# Patient Record
Sex: FEMALE | Race: WHITE | NOT HISPANIC OR LATINO | Employment: FULL TIME | URBAN - METROPOLITAN AREA
[De-identification: names, ages, dates, MRNs, and addresses within clinical notes are randomized per-mention and may not be internally consistent; named-entity substitution may affect disease eponyms.]

---

## 2023-03-16 LAB
BACTERIA, URINE: ABNORMAL /HPF
MUCUS, URINE: ABNORMAL /LPF
RBC, URINE: 2 /HPF (ref 0–5)
SQUAMOUS EPITHELIAL CELLS, URINE: 3 /HPF
WBC, URINE: 48 /HPF (ref 0–5)

## 2023-03-19 LAB — URINE CULTURE: NORMAL

## 2023-11-21 ENCOUNTER — OFFICE VISIT (OUTPATIENT)
Dept: UROLOGY | Facility: CLINIC | Age: 63
End: 2023-11-21
Payer: COMMERCIAL

## 2023-11-21 VITALS — DIASTOLIC BLOOD PRESSURE: 82 MMHG | SYSTOLIC BLOOD PRESSURE: 141 MMHG | TEMPERATURE: 97.1 F | HEART RATE: 90 BPM

## 2023-11-21 DIAGNOSIS — N39.0 RECURRENT UTI: Primary | ICD-10-CM

## 2023-11-21 DIAGNOSIS — N32.81 OAB (OVERACTIVE BLADDER): ICD-10-CM

## 2023-11-21 LAB
APPEARANCE UR: ABNORMAL
BACTERIA #/AREA URNS AUTO: ABNORMAL /HPF
BILIRUB UR STRIP.AUTO-MCNC: NEGATIVE MG/DL
COLOR UR: YELLOW
GLUCOSE UR STRIP.AUTO-MCNC: NEGATIVE MG/DL
HYALINE CASTS #/AREA URNS AUTO: ABNORMAL /LPF
KETONES UR STRIP.AUTO-MCNC: NEGATIVE MG/DL
LEUKOCYTE ESTERASE UR QL STRIP.AUTO: ABNORMAL
MUCOUS THREADS #/AREA URNS AUTO: ABNORMAL /LPF
NITRITE UR QL STRIP.AUTO: POSITIVE
PH UR STRIP.AUTO: 5 [PH]
PROT UR STRIP.AUTO-MCNC: NEGATIVE MG/DL
RBC # UR STRIP.AUTO: NEGATIVE /UL
RBC #/AREA URNS AUTO: ABNORMAL /HPF
SP GR UR STRIP.AUTO: 1.02
SQUAMOUS #/AREA URNS AUTO: ABNORMAL /HPF
UROBILINOGEN UR STRIP.AUTO-MCNC: <2 MG/DL
WBC #/AREA URNS AUTO: ABNORMAL /HPF

## 2023-11-21 PROCEDURE — 1036F TOBACCO NON-USER: CPT | Performed by: NURSE PRACTITIONER

## 2023-11-21 PROCEDURE — 81001 URINALYSIS AUTO W/SCOPE: CPT

## 2023-11-21 PROCEDURE — 99214 OFFICE O/P EST MOD 30 MIN: CPT | Performed by: NURSE PRACTITIONER

## 2023-11-21 RX ORDER — METFORMIN HYDROCHLORIDE EXTENDED-RELEASE TABLETS 1000 MG/1
1000 TABLET, FILM COATED, EXTENDED RELEASE ORAL
COMMUNITY
End: 2024-05-22 | Stop reason: ALTCHOICE

## 2023-11-21 RX ORDER — MIRABEGRON 25 MG/1
25 TABLET, FILM COATED, EXTENDED RELEASE ORAL DAILY
Qty: 30 TABLET | Refills: 3 | Status: SHIPPED | OUTPATIENT
Start: 2023-11-21 | End: 2024-03-20

## 2023-11-21 RX ORDER — NITROFURANTOIN 25; 75 MG/1; MG/1
100 CAPSULE ORAL DAILY
Qty: 90 CAPSULE | Refills: 1 | Status: SHIPPED | OUTPATIENT
Start: 2023-11-21 | End: 2024-05-22 | Stop reason: ALTCHOICE

## 2023-11-21 RX ORDER — METOPROLOL SUCCINATE 25 MG/1
25 TABLET, EXTENDED RELEASE ORAL DAILY
COMMUNITY
End: 2024-05-22 | Stop reason: SDUPTHER

## 2023-11-21 RX ORDER — PRAVASTATIN SODIUM 40 MG/1
40 TABLET ORAL NIGHTLY
COMMUNITY
End: 2024-05-22 | Stop reason: SDUPTHER

## 2023-11-21 RX ORDER — SOLIFENACIN SUCCINATE 10 MG/1
10 TABLET, FILM COATED ORAL DAILY
COMMUNITY
End: 2024-02-13 | Stop reason: ALTCHOICE

## 2023-11-21 RX ORDER — EZETIMIBE 10 MG/1
10 TABLET ORAL DAILY
COMMUNITY
End: 2024-05-22 | Stop reason: SDUPTHER

## 2023-11-21 RX ORDER — LOSARTAN POTASSIUM 50 MG/1
50 TABLET ORAL DAILY
COMMUNITY
End: 2024-05-22 | Stop reason: SDUPTHER

## 2023-11-21 NOTE — PROGRESS NOTES
Subjective   Patient ID: Jackie Malagon is a 63 y.o. female who presents for Follow-up.  Follow up recurrent UTIs and incontinence. History of stage I endometrial cancer s/p hysterectomy in May 2022.      Developed first UTI in August/Sept 2022. Frequency and dysuria with bladder pressure. Treated with Cipro. States she has had 2 since then all at [facility] with her PCP. None since spring 2023.      Patient is bothered by persistent urgency and moderate urge and stress incontinence. Patient is urinating up to 6 times per day and up to 4 times per night. Feels that she empties her bladder well. Denies history of pyelonephritis. Intermittent dysuria. Wears pads. Previously on oxybutynin which was not beneficial. Started on solifenacin in Feb 2023, 5mg, with very minimal improvement. Increased to 10mg with significant dry eyes and dry mouth. Given samples of Myrbetriq however she did not take due to concerns regarding BP.      Drinks 16 oz of coffee, 64 of water, 8 oz cranberry, as well as a can of diet cherry pepsi.           Review of Systems   All other systems reviewed and are negative.      Objective   Physical Exam  Vitals reviewed.     Alert and oriented x3  Moist mucous membranes  Breathes easily on room air  Abdomen soft, nondistended. Obese  No edema  No scleral icterus  No focal neurological deficits  Appears stated age, no acute distress      Assessment/Plan   Diagnoses and all orders for this visit:  Recurrent UTI  -     Urinalysis with Reflex Microscopic and Culture; Standing  -     nitrofurantoin, macrocrystal-monohydrate, (Macrobid) 100 mg capsule; Take 1 capsule (100 mg) by mouth once daily.  OAB (overactive bladder)  -     mirabegron (Myrbetriq) 25 mg tablet extended release 24 hr 24 hr tablet; Take 1 tablet (25 mg) by mouth once daily.    Monitor BP with myrbetriq and stop if BP > 160/100. Given order for standing urine culture

## 2023-11-29 ENCOUNTER — TELEPHONE (OUTPATIENT)
Dept: UROLOGY | Facility: CLINIC | Age: 63
End: 2023-11-29
Payer: COMMERCIAL

## 2023-12-19 ENCOUNTER — APPOINTMENT (OUTPATIENT)
Dept: GYNECOLOGIC ONCOLOGY | Facility: CLINIC | Age: 63
End: 2023-12-19
Payer: COMMERCIAL

## 2023-12-19 NOTE — PROGRESS NOTES
Patient ID: Jackie Malagon is a 63 y.o. female.  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 She states she is overall doing well since her procedure but continues to have intermittent LE swelling with negative  BLE Dopplers for DVT.      Treatment History:  22: aborted diagnostic laparoscopy, lysis of adhesion - transvaginal hysterectomy with bilateral salpingo-oophorectomy (incomplete surgical staging)      Recent Pathology:  22: D&C   A.  Endocervix, curettage:                                           -- Very scant endocervical epithelium with no significant pathologic findings.          -- Scant superficial strips of inactive endometrial epithelium.                                              -- Specimen consists predominantly of blood.                                                                    B.  Endometrium and polyps, curettage:                                                 -- Endometrial adenocarcinoma, endometrioid type with squamous differentiation, FIGO grade 1, in the background of atypical endometrial hyperplasia.  See note.  -- Fragments of benign smooth muscle.  MMR Protein Expression  Protein:         Result:            MLH-1           Positive                                                PMS-2           Positive                                                  MSH-2           Positive                                                MSH-6           Positive  ==================================  Medical History:  - BMI 51  - Hypertension  - HLD   - GERD  - Stress urinary incontinence      Surgical History:  - C/section x2 (via midline incision)  - Cholecystectomy      Obstetric/Gynecologic History:   - Menarche age 12  - Menopausal since   - History of OCP or HRT use: OCPs x1  year, denies HRT  - Last Pap: 2/2022     Family History:   - Mother: melanoma  - Mat. uncle: lung cancer  - MGM: breast cancer      Social History: works as , identifies as Pentecostal. , 3 children  - Tobacco: 4 pack year tobacco use, quit > 15 years ago  - Alcohol: denies  - Illicit substances, narcotics: denies     Health Maintenance:   - Last mammogram: 10/2021      Objective    There were no vitals taken for this visit.     Interval history:  Patient is a 63 year old female with MMR proficient stage 1a FIGO grade 1 endometrioid adenocarcinoma of the uterus s/p aborted diagnostic laparoscopy, lysis of adhesion - transvaginal hysterectomy with bilateral salpingo-oophorectomy on 5/25/22.  Here for 3 month follow up.       Physical Exam:    Constitutional: Doing well. NAKIA  Eyes: PERRL  ENMT: Moist mucus membranes  Head/Neck: Supple. Symmetrical  Cardiovascular: Regular, rate and rhythm. 2+ equal pulses of the extremities  Respiratory/Thorax: CTA. RRR. Chest rise symmetrical.  Gastrointestinal: Non-distended, soft, non-tender  Genitourinary:   Normal external female genitalia. No vulvar lesions noted  Speculum exam: Smooth vaginal walls without lesions or masses. Vaginal cuff visualized without lesions.   Bimanual exam: Smooth vaginal wall without lesions or masses.  Surgically absent uterus, cervix, and adnexa.    Rectovaginal exam: smooth rectovaginal septum without lesions or masses  Musculoskeletal: ROM intact, no joint swelling, normal strength  Extremities: No edema  Neurological: Alert and oriented x 3. Pleasant and cooperative.  Lymphatic: No lymphadenopathy. No lymphedema  Psychological: Appropriate mood and behavior  Skin: Warm and dry, no lesions, no rashes    A complete review of systems was performed and all systems were normal except what is noted in the interval history.          Assessment/Plan    Patient is a 63 year old female with MMR proficient stage 1a FIGO grade 1 endometrioid  adenocarcinoma of the uterus s/p aborted diagnostic laparoscopy, lysis of adhesion - transvaginal hysterectomy with bilateral salpingo-oophorectomy on 5/25/22.        PLAN:

## 2024-01-11 NOTE — PROGRESS NOTES
Patient ID: Jackie Malagon is a 63 y.o. female.  Primary Care Provider: No primary care provider on file.    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 She states she is overall doing well since her procedure but continues to have intermittent LE swelling with negative  BLE Dopplers for DVT.      Treatment History:  22: aborted diagnostic laparoscopy, lysis of adhesion - transvaginal hysterectomy with bilateral salpingo-oophorectomy (incomplete surgical staging)      Recent Pathology:  22: D&C   A.  Endocervix, curettage:                                           -- Very scant endocervical epithelium with no significant pathologic findings.          -- Scant superficial strips of inactive endometrial epithelium.                                              -- Specimen consists predominantly of blood.                                                                    B.  Endometrium and polyps, curettage:                                                 -- Endometrial adenocarcinoma, endometrioid type with squamous differentiation, FIGO grade 1, in the background of atypical endometrial hyperplasia.  See note.  -- Fragments of benign smooth muscle.  MMR Protein Expression  Protein:         Result:            MLH-1           Positive                                                PMS-2           Positive                                                  MSH-2           Positive                                                MSH-6           Positive  ==================================  Medical History:  - BMI 51  - Hypertension  - HLD   - GERD  - Stress urinary incontinence      Surgical History:  - C/section x2 (via midline incision)  - Cholecystectomy      Obstetric/Gynecologic History:   - Menarche age 12  - Menopausal since   - History of OCP or HRT use: OCPs  x1 year, denies HRT  - Last Pap: 2/2022     Family History:   - Mother: melanoma  - Mat. uncle: lung cancer  - MGM: breast cancer      Social History: works as , identifies as Rastafari. 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  Here for 3 month follow up.  Patient was having chronic UTI so been placed on prophylactic Macrobid once a day, no UTI's in 2-3 months.  Patient has baseline urinary leakage.  Patient been taking Vesicare but has not noticed difference, follows up with urology next month.  Patient is not currently sexually active. Mammogram is up to date.  Denies any bowel issues.  Patient denies any vaginal bleeding.       Physical Exam:    Constitutional: Doing well. NAKIA  Eyes: PERRL  ENMT: Moist mucus membranes  Head/Neck: Supple. Symmetrical  Cardiovascular: Regular, rate and rhythm. 2+ equal pulses of the extremities  Respiratory/Thorax: CTA. RRR. Chest rise symmetrical.  Gastrointestinal: Non-distended, soft, non-tender  Genitourinary:   Normal external female genitalia. No vulvar lesions noted  Speculum exam: Smooth vaginal walls without lesions or masses. Vaginal cuff visualized without lesions.   Bimanual exam: Smooth vaginal wall without lesions or masses.  Surgically absent uterus, cervix, and adnexa.    Rectovaginal exam: smooth rectovaginal septum without lesions or masses  Musculoskeletal: ROM intact, no  joint swelling, normal strength  Extremities: No edema  Neurological: Alert and oriented x 3. Pleasant and cooperative.  Lymphatic: No lymphadenopathy. No lymphedema  Psychological: Appropriate mood and behavior  Skin: Warm and dry, no lesions, no rashes    A complete review of systems was performed and all systems were normal except what is noted in the interval history.          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  NAKIA 1.5 years.        PLAN:  F/U in 4 months or as needed  Physical examination was within normal limits today.  She is currently NAKIA.  We reviewed signs and symptoms of possible recurrence with the patient and she will call our office should she experience any of these.

## 2024-01-16 ENCOUNTER — OFFICE VISIT (OUTPATIENT)
Dept: GYNECOLOGIC ONCOLOGY | Facility: CLINIC | Age: 64
End: 2024-01-16
Payer: COMMERCIAL

## 2024-01-16 VITALS
BODY MASS INDEX: 51.32 KG/M2 | WEIGHT: 293 LBS | HEART RATE: 87 BPM | OXYGEN SATURATION: 97 % | DIASTOLIC BLOOD PRESSURE: 82 MMHG | TEMPERATURE: 97 F | RESPIRATION RATE: 16 BRPM | SYSTOLIC BLOOD PRESSURE: 132 MMHG

## 2024-01-16 DIAGNOSIS — C54.1 ENDOMETRIAL CANCER (MULTI): Primary | ICD-10-CM

## 2024-01-16 PROCEDURE — 99213 OFFICE O/P EST LOW 20 MIN: CPT | Performed by: NURSE PRACTITIONER

## 2024-01-16 PROCEDURE — 1036F TOBACCO NON-USER: CPT | Performed by: NURSE PRACTITIONER

## 2024-01-16 ASSESSMENT — PAIN SCALES - GENERAL: PAINLEVEL: 0-NO PAIN

## 2024-01-26 ENCOUNTER — APPOINTMENT (OUTPATIENT)
Dept: UROLOGY | Facility: CLINIC | Age: 64
End: 2024-01-26
Payer: COMMERCIAL

## 2024-01-29 ENCOUNTER — APPOINTMENT (OUTPATIENT)
Dept: UROLOGY | Facility: CLINIC | Age: 64
End: 2024-01-29
Payer: COMMERCIAL

## 2024-02-07 PROBLEM — N39.0 UTI (URINARY TRACT INFECTION): Status: ACTIVE | Noted: 2023-11-08

## 2024-02-07 PROBLEM — H93.13 TINNITUS, BILATERAL: Status: ACTIVE | Noted: 2018-02-23

## 2024-02-07 PROBLEM — E66.01 OBESITY, CLASS III, BMI 40-49.9 (MORBID OBESITY) (MULTI): Status: ACTIVE | Noted: 2018-04-21

## 2024-02-07 PROBLEM — R10.9 RIGHT FLANK PAIN: Status: ACTIVE | Noted: 2024-02-07

## 2024-02-07 PROBLEM — E04.1 THYROID NODULE: Status: ACTIVE | Noted: 2022-02-20

## 2024-02-07 PROBLEM — R93.89 THICKENED ENDOMETRIUM: Status: ACTIVE | Noted: 2024-02-07

## 2024-02-07 PROBLEM — H90.3 SENSORINEURAL HEARING LOSS, BILATERAL: Status: ACTIVE | Noted: 2018-02-23

## 2024-02-07 PROBLEM — E66.813 OBESITY, CLASS III, BMI 40-49.9 (MORBID OBESITY): Status: ACTIVE | Noted: 2018-04-21

## 2024-02-07 PROBLEM — M51.36 DISC DEGENERATION, LUMBAR: Status: ACTIVE | Noted: 2017-10-19

## 2024-02-07 PROBLEM — E11.65 TYPE 2 DIABETES MELLITUS WITH HYPERGLYCEMIA, WITHOUT LONG-TERM CURRENT USE OF INSULIN (MULTI): Status: ACTIVE | Noted: 2018-04-21

## 2024-02-07 PROBLEM — M79.89 LEG SWELLING: Status: ACTIVE | Noted: 2022-08-01

## 2024-02-07 PROBLEM — C54.1 ENDOMETRIAL ADENOCARCINOMA (MULTI): Status: ACTIVE | Noted: 2024-02-07

## 2024-02-07 PROBLEM — Z85.42 HISTORY OF ENDOMETRIAL CANCER: Status: ACTIVE | Noted: 2022-08-01

## 2024-02-07 PROBLEM — N39.41 URGE INCONTINENCE: Status: ACTIVE | Noted: 2018-10-20

## 2024-02-07 PROBLEM — N28.1 CYST OF LEFT KIDNEY: Status: ACTIVE | Noted: 2017-10-19

## 2024-02-07 PROBLEM — E55.9 VITAMIN D DEFICIENCY: Status: ACTIVE | Noted: 2017-10-18

## 2024-02-07 PROBLEM — N95.0 PMB (POSTMENOPAUSAL BLEEDING): Status: ACTIVE | Noted: 2024-02-07

## 2024-02-07 PROBLEM — M51.369 DISC DEGENERATION, LUMBAR: Status: ACTIVE | Noted: 2017-10-19

## 2024-02-07 PROBLEM — E73.9 LACTOSE INTOLERANCE IN ADULT: Status: ACTIVE | Noted: 2024-02-07

## 2024-02-07 RX ORDER — VIT C/E/ZN/COPPR/LUTEIN/ZEAXAN 250MG-90MG
CAPSULE ORAL
COMMUNITY

## 2024-02-07 RX ORDER — TALC
5 POWDER (GRAM) TOPICAL
COMMUNITY
End: 2024-02-13 | Stop reason: ALTCHOICE

## 2024-02-07 RX ORDER — METHENAMINE, SODIUM PHOSPHATE, MONOBASIC, MONOHYDRATE, PHENYL SALICYLATE, METHYLENE BLUE, AND HYOSCYAMINE SULFATE 118; 40.8; 36; 10; .12 MG/1; MG/1; MG/1; MG/1; MG/1
CAPSULE ORAL
COMMUNITY
Start: 2023-03-21 | End: 2024-02-13 | Stop reason: ALTCHOICE

## 2024-02-07 RX ORDER — LANOLIN ALCOHOL/MO/W.PET/CERES
CREAM (GRAM) TOPICAL
COMMUNITY
Start: 2022-02-24 | End: 2024-02-13 | Stop reason: ALTCHOICE

## 2024-02-07 RX ORDER — CHOLECALCIFEROL (VITAMIN D3) 125 MCG
1 CAPSULE ORAL
COMMUNITY
End: 2024-02-13 | Stop reason: ALTCHOICE

## 2024-02-12 ENCOUNTER — APPOINTMENT (OUTPATIENT)
Dept: PRIMARY CARE | Facility: CLINIC | Age: 64
End: 2024-02-12
Payer: COMMERCIAL

## 2024-02-13 ENCOUNTER — OFFICE VISIT (OUTPATIENT)
Dept: PRIMARY CARE | Facility: CLINIC | Age: 64
End: 2024-02-13
Payer: COMMERCIAL

## 2024-02-13 VITALS
OXYGEN SATURATION: 98 % | DIASTOLIC BLOOD PRESSURE: 70 MMHG | WEIGHT: 293 LBS | SYSTOLIC BLOOD PRESSURE: 128 MMHG | HEART RATE: 92 BPM | HEIGHT: 65 IN | BODY MASS INDEX: 48.82 KG/M2

## 2024-02-13 DIAGNOSIS — C54.1 ENDOMETRIAL ADENOCARCINOMA (MULTI): ICD-10-CM

## 2024-02-13 DIAGNOSIS — M79.89 LEG SWELLING: ICD-10-CM

## 2024-02-13 DIAGNOSIS — N39.41 URGE INCONTINENCE: ICD-10-CM

## 2024-02-13 DIAGNOSIS — E11.65 TYPE 2 DIABETES MELLITUS WITH HYPERGLYCEMIA, WITHOUT LONG-TERM CURRENT USE OF INSULIN (MULTI): ICD-10-CM

## 2024-02-13 DIAGNOSIS — E78.2 MIXED HYPERLIPIDEMIA: Primary | ICD-10-CM

## 2024-02-13 DIAGNOSIS — I10 PRIMARY HYPERTENSION: ICD-10-CM

## 2024-02-13 DIAGNOSIS — R19.09 GROIN SWELLING: ICD-10-CM

## 2024-02-13 PROCEDURE — 3078F DIAST BP <80 MM HG: CPT | Performed by: NURSE PRACTITIONER

## 2024-02-13 PROCEDURE — 4010F ACE/ARB THERAPY RXD/TAKEN: CPT | Performed by: NURSE PRACTITIONER

## 2024-02-13 PROCEDURE — 99204 OFFICE O/P NEW MOD 45 MIN: CPT | Performed by: NURSE PRACTITIONER

## 2024-02-13 PROCEDURE — 1036F TOBACCO NON-USER: CPT | Performed by: NURSE PRACTITIONER

## 2024-02-13 PROCEDURE — 3074F SYST BP LT 130 MM HG: CPT | Performed by: NURSE PRACTITIONER

## 2024-02-13 RX ORDER — BISMUTH SUBSALICYLATE 262 MG
1 TABLET,CHEWABLE ORAL DAILY
COMMUNITY

## 2024-02-13 ASSESSMENT — PATIENT HEALTH QUESTIONNAIRE - PHQ9
SUM OF ALL RESPONSES TO PHQ9 QUESTIONS 1 & 2: 0
2. FEELING DOWN, DEPRESSED OR HOPELESS: NOT AT ALL
1. LITTLE INTEREST OR PLEASURE IN DOING THINGS: NOT AT ALL

## 2024-02-13 NOTE — PROGRESS NOTES
"Subjective   Patient ID: Jackie Malagon is a 63 y.o. female who presents for new pt to establish.  She would like to establish as a pt of Dr Bryson Mejia     HPI     Dm 2:   Meds: Metformin 1000 mg per day  States has not had an hga1c since 2/23    Stress Urinary Incontinence: urology Veronica Simon CNP  Meds: Not currently taking medications  Previously prescribed Myrberqi but did not start because of b/p concerns    HTN, SVT: Cardio Dr Carlos Enrique Zapata  Meds: Toprol XL    HLD:   Meds: Pravastatin, Zetia    Endometrial Cancer: Oncologist Brenda Bryan CNP     Procedure Name: 5/22  1. Diagnostic laparoscopy   2. Lysis of adhesions with aborted robotic hysterectomy   3. Total Vaginal hysterectomy  4. Bilateral salpingooophorectomy  5. Cystoscopy     Developed LE edema after hysterectomy  7/22 Bilat LE duplex neg for DVT  States that her left inguinal groin/upper thigh has also been swollen. States it is painful at times.   Still feels at time that her left leg is larger than her right.  Ct of abd completed     Review of Systems    Objective   /70   Pulse 92   Ht 1.651 m (5' 5\")   Wt 139 kg (306 lb)   SpO2 98%   BMI 50.92 kg/m²     Reviewed Medical History form completed by patient      Physical Exam    Alert and oriented x 3  Eyes: EOM grossly intact  Neck supple without lymph adenopathy or carotid bruit  Heart regular rate and rhythm without murmur.  Lungs clear to auscultation.  Gait is non-antalgic  Speech clear.  Hearing adequate.  Psych: Normal affect. Good judgment and insight.   Bilat LE measured:  Left calf is 560 Cm, Right calf is 570 cm  Left inner groin is swollen     Assessment/Plan     New Patient    1. Type 2 diabetes mellitus with hyperglycemia, without long-term current use of insulin (CMS/[facility])    - Comprehensive metabolic panel; Future  - Hemoglobin A1c; Future  - Lipid panel; Future    2. Mixed hyperlipidemia  Follow-up with specialist per their discretion/direction.   - Comprehensive " metabolic panel; Future  - Hemoglobin A1c; Future  - Lipid panel; Future    3. Urge incontinence  Follow-up with specialist per their discretion/direction.   I will reach out to urology to discuss her current symptoms and her groin swelling     4. Primary hypertension  Follow-up with specialist per their discretion/direction.     5. Endometrial adenocarcinoma (CMS/HCC)  Follow-up with specialist per their discretion/direction.     6. Leg swelling      7. Groin swelling  I will follow up with pt regarding plan    Follow up:  3 months for med management with Dr Bryson Mejia    Medications refills will be completed as discussed.     Any labs or testing that is ordered will be reviewed and the results will be in your chart .   You can review these via  [employer].     Prescriptions will not be filled unless you are compliant with your follow-up appointments or have a follow-up appointment scheduled as per the instruction of your provider. Refills for medications should be requested at the time of your office visit.     Please allow one week for refill requests to be completed.     Contact office with any questions or concerns.   Preferred communication is via  [employer]  Please contact [email or web address] if having issues with  [employer]    Elizabeth Engel APRN-[facility]  [address], [address]  [place] [address]  Phone: [phone]    **Charting was completed using voice recognition technology and may include unintended errors**

## 2024-02-18 PROBLEM — R19.09 GROIN SWELLING: Status: ACTIVE | Noted: 2024-02-18

## 2024-02-22 ENCOUNTER — APPOINTMENT (OUTPATIENT)
Dept: UROLOGY | Facility: CLINIC | Age: 64
End: 2024-02-22
Payer: COMMERCIAL

## 2024-02-24 ENCOUNTER — LAB (OUTPATIENT)
Dept: LAB | Facility: LAB | Age: 64
End: 2024-02-24
Payer: COMMERCIAL

## 2024-02-24 DIAGNOSIS — E11.65 TYPE 2 DIABETES MELLITUS WITH HYPERGLYCEMIA, WITHOUT LONG-TERM CURRENT USE OF INSULIN (MULTI): ICD-10-CM

## 2024-02-24 DIAGNOSIS — E78.2 MIXED HYPERLIPIDEMIA: ICD-10-CM

## 2024-02-24 LAB
ALBUMIN SERPL BCP-MCNC: 4.1 G/DL (ref 3.4–5)
ALP SERPL-CCNC: 77 U/L (ref 33–136)
ALT SERPL W P-5'-P-CCNC: 34 U/L (ref 7–45)
ANION GAP SERPL CALC-SCNC: 15 MMOL/L (ref 10–20)
AST SERPL W P-5'-P-CCNC: 49 U/L (ref 9–39)
BILIRUB SERPL-MCNC: 0.6 MG/DL (ref 0–1.2)
BUN SERPL-MCNC: 14 MG/DL (ref 6–23)
CALCIUM SERPL-MCNC: 9.6 MG/DL (ref 8.6–10.3)
CHLORIDE SERPL-SCNC: 102 MMOL/L (ref 98–107)
CHOLEST SERPL-MCNC: 161 MG/DL (ref 0–199)
CHOLESTEROL/HDL RATIO: 3.2
CO2 SERPL-SCNC: 27 MMOL/L (ref 21–32)
CREAT SERPL-MCNC: 0.93 MG/DL (ref 0.5–1.05)
EGFRCR SERPLBLD CKD-EPI 2021: 69 ML/MIN/1.73M*2
EST. AVERAGE GLUCOSE BLD GHB EST-MCNC: 217 MG/DL
GLUCOSE SERPL-MCNC: 203 MG/DL (ref 74–99)
HBA1C MFR BLD: 9.2 %
HDLC SERPL-MCNC: 50.6 MG/DL
LDLC SERPL CALC-MCNC: 70 MG/DL
NON HDL CHOLESTEROL: 110 MG/DL (ref 0–149)
POTASSIUM SERPL-SCNC: 5 MMOL/L (ref 3.5–5.3)
PROT SERPL-MCNC: 7 G/DL (ref 6.4–8.2)
SODIUM SERPL-SCNC: 139 MMOL/L (ref 136–145)
TRIGL SERPL-MCNC: 201 MG/DL (ref 0–149)
VLDL: 40 MG/DL (ref 0–40)

## 2024-02-24 PROCEDURE — 80053 COMPREHEN METABOLIC PANEL: CPT

## 2024-02-24 PROCEDURE — 36415 COLL VENOUS BLD VENIPUNCTURE: CPT

## 2024-02-24 PROCEDURE — 80061 LIPID PANEL: CPT

## 2024-02-24 PROCEDURE — 83036 HEMOGLOBIN GLYCOSYLATED A1C: CPT

## 2024-02-26 DIAGNOSIS — E11.65 TYPE 2 DIABETES MELLITUS WITH HYPERGLYCEMIA, WITHOUT LONG-TERM CURRENT USE OF INSULIN (MULTI): Primary | ICD-10-CM

## 2024-02-26 RX ORDER — METFORMIN HYDROCHLORIDE 1000 MG/1
1000 TABLET ORAL
Qty: 60 TABLET | Refills: 3 | Status: SHIPPED | OUTPATIENT
Start: 2024-02-26 | End: 2024-05-22 | Stop reason: SDUPTHER

## 2024-03-01 ENCOUNTER — OFFICE VISIT (OUTPATIENT)
Dept: UROLOGY | Facility: CLINIC | Age: 64
End: 2024-03-01
Payer: COMMERCIAL

## 2024-03-01 VITALS — TEMPERATURE: 96.5 F | DIASTOLIC BLOOD PRESSURE: 88 MMHG | HEART RATE: 94 BPM | SYSTOLIC BLOOD PRESSURE: 150 MMHG

## 2024-03-01 DIAGNOSIS — N32.81 OAB (OVERACTIVE BLADDER): ICD-10-CM

## 2024-03-01 DIAGNOSIS — N39.3 STRESS INCONTINENCE IN FEMALE: ICD-10-CM

## 2024-03-01 DIAGNOSIS — N39.41 URGE INCONTINENCE: Primary | ICD-10-CM

## 2024-03-01 PROCEDURE — 4010F ACE/ARB THERAPY RXD/TAKEN: CPT | Performed by: NURSE PRACTITIONER

## 2024-03-01 PROCEDURE — 3046F HEMOGLOBIN A1C LEVEL >9.0%: CPT | Performed by: NURSE PRACTITIONER

## 2024-03-01 PROCEDURE — 1036F TOBACCO NON-USER: CPT | Performed by: NURSE PRACTITIONER

## 2024-03-01 PROCEDURE — 99214 OFFICE O/P EST MOD 30 MIN: CPT | Performed by: NURSE PRACTITIONER

## 2024-03-01 PROCEDURE — 3048F LDL-C <100 MG/DL: CPT | Performed by: NURSE PRACTITIONER

## 2024-03-01 PROCEDURE — 3077F SYST BP >= 140 MM HG: CPT | Performed by: NURSE PRACTITIONER

## 2024-03-01 PROCEDURE — 3079F DIAST BP 80-89 MM HG: CPT | Performed by: NURSE PRACTITIONER

## 2024-03-01 NOTE — PATIENT INSTRUCTIONS
URODYNAMIC STUDIES (UDS)  PURPOSE:  TESTING WILL TAKE 60-90 MINUTES.  IT IS TO EVALUATE THE FUNCTION OF YOUR BLADDER AND URETHRA (TUBE THROUGH WHICH YOUR URINE EXITS THE BODY).  YOUR URODYNAMIC STUDY WILL HELP YOUR PHYSICIAN EVALUATE HOW WELL THE URNIARY OUTFLOW SYSTEM DOES ITS JOB OF STORING, TRANSPORTING AND EXPELLING URINE.  THIS TEST WILL ALLOW US TO COME UP WITH A TREATMENT PLAN CUSTOMIZED TO HELP YOU.  THREE TESTS MAKE UP THE URODYNAMIC STUDY  CYSTOMETROGRAM  SPHINCTER ELECTROMYOGRAPHY  INTRA-ABDOMINAL PRESSURE    PREPARATION  IF YOU TAKE ANTIBIOTICS PRIOR TO A DENTAL APPOINTMENT, PLEASE NOTIFY YOUR DOCTOR AND THE URODYNAMIC TECH.  TRY TO HAVE A BOWEL MOVEMENT THE MORNING OF THE STUDY.  STOP YOUR URINARY MEDICATIONS 48 HOURS PRIOR TO THE STUDY AS INSTRUCTED BY YOUR PROVIDER.  STEPS   ARRIVE FOR YOUR TEST WITH A COMFORTABLY FULL BALDDER AND YOU WILL BE ASKED TO URINATE INTO THE UROFLOW MACHINE.  THREE SURFACE ELECTRODES (STICKY PADS) WILL BE USED, ONE ON THE KNEE, AND TWO PLACED BY THE RECTAL OPENING.  A SMALL TUBE WILL BE PLACED IN YOUR BLADDER AND ONE IN YOUR RECTUM FOR THE UDS TESTING.  YOU WILL BE ASKED TO TELL THE TECHNICIAN SENSATIONS YOU FEEL AS YOUR BLADDER IS FILLING.  YOU WILL BE ASKED TO EMPTY YOUR BLADDER WHEN IT IS FULL AND THEN THE TEST WILL BE OVER.    FAQs  CAN I DRINK OR EAT THE DAY OF THE STUDY? YES, THERE ARE NO RESTRICTIONS.  WILL I BE ABLE TO RETURN TO NORMAL ACTIVITIES IMMEDIATELY AFTER THE TEST?  YES, HOWEVER YOU MAY FEEL A SLIGHT BURNING SENSATION WITH URINATION AND YOU ARE ENCOURAGED TO INCREASE YOUR FLUD INTAKE FOR 24 HOURS.    WHAT TO EXPECT AFTER YOUR URODYNAMIC TESTING  AFTER THE URODYNAMIC TESTING, SOME PEOPLE FEEL A SLIGHT STINGING OR BURNING SENSATION WHEN THEY URINATE.  IT IS NOT UNUSUAL TO FIND A SMALL AMOUNT OF BLOOD IN YOUR URINE OR RECTAL SPOTTING WHEN YOU USE THE RESTROOM.  IF YOU DRINK PLENTY OF FLUIDS THESE SYMPTOMS WILL USUALLY QUICKLY CLEAR UP.  DRINKING EXTRA FLUIDS  WILL HELP TO FLUSH YOUR SYSTEM.  WE RECOMMEND YOU CUT BACK ON CAFFEINATED BEVERAGES AND AVOID ALCOHOL FOR 24 HOURS AFTER THE TESTING.  THIS WILL HELP LESSEN ANY BLADDER IRRITATION UNTIL YOUR BLADDER FUNCTION RETURNS TO NORMAL.  MOST PEOPLE HAVE URODYNAMIC TESTING WITHOUT EXPERIENCING ANY DISCOMFORT OR PROBLEMS AT ALL.  HOWEVER, THERE IS A SMAL CHANCE THAT YOU COULD DEVELOP A URINARY TRACT INFECTION.    PLEASE CONTACT YOUR REFERRING PHYSICIAN IN YOU DEVELOP ANY OF THE FOLLOWING SYMPTOMS  YOUR URINE SMELLS, IS COULDY OR HAS MORE THAN A SMALL AMOUNT OF BLOOD IN IT.  A STRONGER THAN USUAL URGE TO PASS URINE.  PAIN WITH URINATION THAT LASTS BEYOND 48 HOURS.  LOWER BACK ACHE OR PAIN IN YOUR KIDNEY AREA  IF YOU DEVELOP A HIGH TEMPERATURE.     TO CONTACT THE [facility], CALL [phone] OR [email or web address].

## 2024-03-01 NOTE — PROGRESS NOTES
Subjective   Patient ID: Jackie Malagon is a 63 y.o. female who presents for Alternatives for OAB follow up .  Follow up recurrent UTIs and incontinence. History of stage I endometrial cancer s/p hysterectomy in May 2022.      Developed first UTI in August/Sept 2022. Frequency and dysuria with bladder pressure. Treated with Cipro. States she [facility]. None since spring 2023.      Patient is bothered by persistent urgency and moderate urge and stress incontinence. Patient is urinating up to 6 times per day and up to 4 times per night. Feels that she empties her bladder well. Denies history of pyelonephritis. Intermittent dysuria. Wears pads. Previously on oxybutynin which was not beneficial. Started on solifenacin in Feb 2023, 5mg, with very minimal improvement. Increased to 10mg with significant dry eyes and dry mouth. Insurance denied myrbetriq. Previous negative test for BOBBY.      Drinks 16 oz of coffee, 64 of water, 8 oz cranberry, as well as a can of diet cherry pepsi.           Review of Systems   All other systems reviewed and are negative.      Objective   Physical Exam  Vitals reviewed.     Alert and oriented x3  Moist mucous membranes  Breathes easily on room air  Abdomen soft, nondistended. Obese  No edema  No scleral icterus  No focal neurological deficits  Appears stated age, no acute distress      Assessment/Plan   Diagnoses and all orders for this visit:  Urge incontinence  -     Urodynamic studies; Future  -     Urinalysis with Reflex Culture and Microscopic; Future  OAB (overactive bladder)  -     Urodynamic studies; Future  -     Urinalysis with Reflex Culture and Microscopic; Future  Stress incontinence in female  -     Urodynamic studies; Future  -     Urinalysis with Reflex Culture and Microscopic; Future    Will refer for UDS and discussion of tx options with Dr. Tse. Patient is in agreement.        Veronica Simon, MATHEUS-CNP 03/01/24 4:51 PM

## 2024-03-14 ENCOUNTER — LAB (OUTPATIENT)
Dept: LAB | Facility: LAB | Age: 64
End: 2024-03-14
Payer: COMMERCIAL

## 2024-03-14 DIAGNOSIS — N39.3 STRESS INCONTINENCE IN FEMALE: ICD-10-CM

## 2024-03-14 DIAGNOSIS — N39.41 URGE INCONTINENCE: ICD-10-CM

## 2024-03-14 DIAGNOSIS — N32.81 OAB (OVERACTIVE BLADDER): ICD-10-CM

## 2024-03-14 DIAGNOSIS — B37.49 YEAST UTI: Primary | ICD-10-CM

## 2024-03-14 LAB
APPEARANCE UR: ABNORMAL
BACTERIA #/AREA URNS AUTO: ABNORMAL /HPF
BILIRUB UR STRIP.AUTO-MCNC: NEGATIVE MG/DL
COLOR UR: ABNORMAL
GLUCOSE UR STRIP.AUTO-MCNC: NORMAL MG/DL
HOLD SPECIMEN: NORMAL
KETONES UR STRIP.AUTO-MCNC: NEGATIVE MG/DL
LEUKOCYTE ESTERASE UR QL STRIP.AUTO: ABNORMAL
MUCOUS THREADS #/AREA URNS AUTO: ABNORMAL /LPF
NITRITE UR QL STRIP.AUTO: NEGATIVE
PH UR STRIP.AUTO: 5.5 [PH]
PROT UR STRIP.AUTO-MCNC: ABNORMAL MG/DL
RBC # UR STRIP.AUTO: NEGATIVE /UL
RBC #/AREA URNS AUTO: ABNORMAL /HPF
SP GR UR STRIP.AUTO: 1.01
SQUAMOUS #/AREA URNS AUTO: ABNORMAL /HPF
UROBILINOGEN UR STRIP.AUTO-MCNC: NORMAL MG/DL
WBC #/AREA URNS AUTO: >50 /HPF
YEAST BUDDING #/AREA UR COMP ASSIST: PRESENT /HPF

## 2024-03-14 PROCEDURE — 87186 SC STD MICRODIL/AGAR DIL: CPT

## 2024-03-14 PROCEDURE — 81001 URINALYSIS AUTO W/SCOPE: CPT

## 2024-03-14 PROCEDURE — 87086 URINE CULTURE/COLONY COUNT: CPT

## 2024-03-14 RX ORDER — FLUCONAZOLE 150 MG/1
150 TABLET ORAL DAILY
Qty: 7 TABLET | Refills: 0 | Status: SHIPPED | OUTPATIENT
Start: 2024-03-14 | End: 2024-03-21

## 2024-03-15 DIAGNOSIS — N30.00 ACUTE CYSTITIS WITHOUT HEMATURIA: Primary | ICD-10-CM

## 2024-03-16 LAB — BACTERIA UR CULT: ABNORMAL

## 2024-03-18 RX ORDER — SULFAMETHOXAZOLE AND TRIMETHOPRIM 800; 160 MG/1; MG/1
1 TABLET ORAL 2 TIMES DAILY
Qty: 10 TABLET | Refills: 0 | Status: SHIPPED | OUTPATIENT
Start: 2024-03-18 | End: 2024-03-23

## 2024-03-19 DIAGNOSIS — N39.41 URGE INCONTINENCE: Primary | ICD-10-CM

## 2024-03-20 ENCOUNTER — APPOINTMENT (OUTPATIENT)
Dept: UROLOGY | Facility: CLINIC | Age: 64
End: 2024-03-20
Payer: COMMERCIAL

## 2024-03-28 ENCOUNTER — APPOINTMENT (OUTPATIENT)
Dept: UROLOGY | Facility: CLINIC | Age: 64
End: 2024-03-28
Payer: COMMERCIAL

## 2024-04-11 ENCOUNTER — APPOINTMENT (OUTPATIENT)
Dept: UROLOGY | Facility: CLINIC | Age: 64
End: 2024-04-11
Payer: COMMERCIAL

## 2024-05-17 ENCOUNTER — LAB (OUTPATIENT)
Dept: LAB | Facility: LAB | Age: 64
End: 2024-05-17
Payer: COMMERCIAL

## 2024-05-17 DIAGNOSIS — E11.65 TYPE 2 DIABETES MELLITUS WITH HYPERGLYCEMIA, WITHOUT LONG-TERM CURRENT USE OF INSULIN (MULTI): ICD-10-CM

## 2024-05-17 LAB
ALBUMIN SERPL BCP-MCNC: 3.9 G/DL (ref 3.4–5)
ALP SERPL-CCNC: 80 U/L (ref 33–136)
ALT SERPL W P-5'-P-CCNC: 36 U/L (ref 7–45)
ANION GAP SERPL CALC-SCNC: 16 MMOL/L (ref 10–20)
AST SERPL W P-5'-P-CCNC: 43 U/L (ref 9–39)
BILIRUB SERPL-MCNC: 0.6 MG/DL (ref 0–1.2)
BUN SERPL-MCNC: 18 MG/DL (ref 6–23)
CALCIUM SERPL-MCNC: 9.4 MG/DL (ref 8.6–10.6)
CHLORIDE SERPL-SCNC: 102 MMOL/L (ref 98–107)
CO2 SERPL-SCNC: 25 MMOL/L (ref 21–32)
CREAT SERPL-MCNC: 0.97 MG/DL (ref 0.5–1.05)
EGFRCR SERPLBLD CKD-EPI 2021: 65 ML/MIN/1.73M*2
GLUCOSE SERPL-MCNC: 134 MG/DL (ref 74–99)
POTASSIUM SERPL-SCNC: 4.5 MMOL/L (ref 3.5–5.3)
PROT SERPL-MCNC: 7.2 G/DL (ref 6.4–8.2)
SODIUM SERPL-SCNC: 138 MMOL/L (ref 136–145)

## 2024-05-17 PROCEDURE — 80053 COMPREHEN METABOLIC PANEL: CPT

## 2024-05-17 PROCEDURE — 36415 COLL VENOUS BLD VENIPUNCTURE: CPT

## 2024-05-17 NOTE — PROGRESS NOTES
Patient ID: Jackie Malagon is a 64 y.o. female.  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 She states she is overall doing well since her procedure but continues to have intermittent LE swelling with negative  BLE Dopplers for DVT.      Treatment History:  22: aborted diagnostic laparoscopy, lysis of adhesion - transvaginal hysterectomy with bilateral salpingo-oophorectomy (incomplete surgical staging)      Recent Pathology:  22: D&C   A.  Endocervix, curettage:                                           -- Very scant endocervical epithelium with no significant pathologic findings.          -- Scant superficial strips of inactive endometrial epithelium.                                              -- Specimen consists predominantly of blood.                                                                    B.  Endometrium and polyps, curettage:                                                 -- Endometrial adenocarcinoma, endometrioid type with squamous differentiation, FIGO grade 1, in the background of atypical endometrial hyperplasia.  See note.  -- Fragments of benign smooth muscle.  MMR Protein Expression  Protein:         Result:            MLH-1           Positive                                                PMS-2           Positive                                                  MSH-2           Positive                                                MSH-6           Positive  ==================================  Medical History:  - BMI 51  - Hypertension  - HLD   - GERD  - Stress urinary incontinence      Surgical History:  - C/section x2 (via midline incision)  - Cholecystectomy      Obstetric/Gynecologic History:   - Menarche age 12  - Menopausal since   - History of OCP or HRT use: OCPs x1 year, denies  HRT  - Last Pap: 2/2022     Family History:   - Mother: melanoma  - Mat. uncle: lung cancer  - MGM: breast cancer      Social History: works as , identifies as [employer]. , 3 children  - Tobacco: 4 pack year tobacco use, quit > 15 years ago  - Alcohol: denies  - Illicit substances, narcotics: denies     Health Maintenance:   - Last mammogram: 10/2021      Objective    Visit Vitals  /73 (BP Location: Right arm, Patient Position: Sitting, BP Cuff Size: Adult)   Pulse 88   Temp 36.3 °C (97.3 °F) (Temporal)   Resp 16          Interval history:  Patient is a 64 year old female with MMR proficient stage 1a FIGO grade 1 endometrioid adenocarcinoma of the uterus s/p aborted diagnostic laparoscopy, lysis of adhesion - transvaginal hysterectomy with bilateral salpingo-oophorectomy on 5/25/22.  Here for 3 month follow up.  Was having recurrent UTI's so placed on prophylactic abx. Patient still taking Macrobid daily but thinks contributing to some GI issues.  She also has been increased on her Metformin to 2,000mg daily.  She is awaiting new HgbA1c results.  She starts with new PCP tomorrow.  She has been working on some weight loss.  She has started a probiotic.  Patient is not currently sexually active. Has urinary frequency.  Denies bleeding or pink tinged discharge.        Physical Exam:    Constitutional: Doing well. NAKAI  Eyes: PERRL  ENMT: Moist mucus membranes  Head/Neck: Supple. Symmetrical  Cardiovascular: Regular, rate and rhythm. 2+ equal pulses of the extremities  Respiratory/Thorax: CTA. RRR. Chest rise symmetrical.  Gastrointestinal: Non-distended, soft, non-tender  Genitourinary:   Normal external female genitalia. No vulvar lesions noted  Speculum exam: Smooth vaginal walls without lesions or masses. Vaginal cuff visualized without lesions. White discharge noted in vaginal vault.    Bimanual exam: Smooth vaginal wall without lesions or masses.  Surgically absent uterus, cervix, and adnexa.     Rectovaginal exam: smooth rectovaginal septum without lesions or masses  Musculoskeletal: ROM intact, no joint swelling, normal strength  Extremities: No edema  Neurological: Alert and oriented x 3. Pleasant and cooperative.  Lymphatic: No lymphadenopathy. No lymphedema  Psychological: Appropriate mood and behavior  Skin: Warm and dry, no lesions, no rashes    A complete review of systems was performed and all systems were normal except what is noted in the interval history.          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 NAKIA 2 years.  Yeast infection of the vagina.        PLAN:  RX for Diflucan 150 mg x1  Mammogram order entered  F/U in 6 months or as needed  Physical examination was within normal limits today.  She is currently NAKIA.  We reviewed signs and symptoms of possible recurrence with the patient and she will call our office should she experience any of these.

## 2024-05-21 ENCOUNTER — OFFICE VISIT (OUTPATIENT)
Dept: GYNECOLOGIC ONCOLOGY | Facility: CLINIC | Age: 64
End: 2024-05-21
Payer: COMMERCIAL

## 2024-05-21 VITALS
DIASTOLIC BLOOD PRESSURE: 73 MMHG | BODY MASS INDEX: 48.87 KG/M2 | TEMPERATURE: 97.3 F | RESPIRATION RATE: 16 BRPM | WEIGHT: 293 LBS | OXYGEN SATURATION: 96 % | SYSTOLIC BLOOD PRESSURE: 124 MMHG | HEART RATE: 88 BPM

## 2024-05-21 DIAGNOSIS — C54.1 ENDOMETRIAL CANCER (MULTI): ICD-10-CM

## 2024-05-21 DIAGNOSIS — B37.31 YEAST INFECTION OF THE VAGINA: Primary | ICD-10-CM

## 2024-05-21 DIAGNOSIS — Z12.31 SCREENING MAMMOGRAM, ENCOUNTER FOR: ICD-10-CM

## 2024-05-21 PROCEDURE — 3046F HEMOGLOBIN A1C LEVEL >9.0%: CPT | Performed by: NURSE PRACTITIONER

## 2024-05-21 PROCEDURE — 1159F MED LIST DOCD IN RCRD: CPT | Performed by: NURSE PRACTITIONER

## 2024-05-21 PROCEDURE — 3078F DIAST BP <80 MM HG: CPT | Performed by: NURSE PRACTITIONER

## 2024-05-21 PROCEDURE — 99214 OFFICE O/P EST MOD 30 MIN: CPT | Performed by: NURSE PRACTITIONER

## 2024-05-21 PROCEDURE — 1036F TOBACCO NON-USER: CPT | Performed by: NURSE PRACTITIONER

## 2024-05-21 PROCEDURE — 4010F ACE/ARB THERAPY RXD/TAKEN: CPT | Performed by: NURSE PRACTITIONER

## 2024-05-21 PROCEDURE — 1160F RVW MEDS BY RX/DR IN RCRD: CPT | Performed by: NURSE PRACTITIONER

## 2024-05-21 PROCEDURE — 1126F AMNT PAIN NOTED NONE PRSNT: CPT | Performed by: NURSE PRACTITIONER

## 2024-05-21 PROCEDURE — 3048F LDL-C <100 MG/DL: CPT | Performed by: NURSE PRACTITIONER

## 2024-05-21 PROCEDURE — 3074F SYST BP LT 130 MM HG: CPT | Performed by: NURSE PRACTITIONER

## 2024-05-21 RX ORDER — FLUCONAZOLE 150 MG/1
150 TABLET ORAL ONCE
Qty: 1 TABLET | Refills: 0 | Status: SHIPPED | OUTPATIENT
Start: 2024-05-21 | End: 2024-05-22

## 2024-05-21 RX ORDER — BUTYROSPERMUM PARKII(SHEA BUTTER), SIMMONDSIA CHINENSIS (JOJOBA) SEED OIL, ALOE BARBADENSIS LEAF EXTRACT .01; 1; 3.5 G/100G; G/100G; G/100G
250 LIQUID TOPICAL 2 TIMES DAILY
COMMUNITY

## 2024-05-21 ASSESSMENT — PAIN SCALES - GENERAL: PAINLEVEL: 0-NO PAIN

## 2024-05-22 ENCOUNTER — OFFICE VISIT (OUTPATIENT)
Dept: PRIMARY CARE | Facility: CLINIC | Age: 64
End: 2024-05-22
Payer: COMMERCIAL

## 2024-05-22 VITALS
BODY MASS INDEX: 48.82 KG/M2 | SYSTOLIC BLOOD PRESSURE: 136 MMHG | OXYGEN SATURATION: 97 % | HEART RATE: 100 BPM | HEIGHT: 65 IN | DIASTOLIC BLOOD PRESSURE: 78 MMHG | WEIGHT: 293 LBS

## 2024-05-22 DIAGNOSIS — N39.0 RECURRENT UTI (URINARY TRACT INFECTION): ICD-10-CM

## 2024-05-22 DIAGNOSIS — E66.01 CLASS 3 SEVERE OBESITY DUE TO EXCESS CALORIES WITH SERIOUS COMORBIDITY AND BODY MASS INDEX (BMI) OF 45.0 TO 49.9 IN ADULT (MULTI): ICD-10-CM

## 2024-05-22 DIAGNOSIS — I10 HYPERTENSION, UNSPECIFIED TYPE: ICD-10-CM

## 2024-05-22 DIAGNOSIS — R19.7 DIARRHEA OF PRESUMED INFECTIOUS ORIGIN: Primary | ICD-10-CM

## 2024-05-22 DIAGNOSIS — E11.65 TYPE 2 DIABETES MELLITUS WITH HYPERGLYCEMIA, WITHOUT LONG-TERM CURRENT USE OF INSULIN (MULTI): ICD-10-CM

## 2024-05-22 DIAGNOSIS — E78.5 HYPERLIPIDEMIA, UNSPECIFIED HYPERLIPIDEMIA TYPE: ICD-10-CM

## 2024-05-22 PROBLEM — E66.813 CLASS 3 SEVERE OBESITY DUE TO EXCESS CALORIES WITH SERIOUS COMORBIDITY AND BODY MASS INDEX (BMI) OF 45.0 TO 49.9 IN ADULT: Status: ACTIVE | Noted: 2018-04-21

## 2024-05-22 LAB — POC HEMOGLOBIN A1C: 7.3 % (ref 4.2–6.5)

## 2024-05-22 PROCEDURE — 83036 HEMOGLOBIN GLYCOSYLATED A1C: CPT | Performed by: FAMILY MEDICINE

## 2024-05-22 PROCEDURE — 4010F ACE/ARB THERAPY RXD/TAKEN: CPT | Performed by: FAMILY MEDICINE

## 2024-05-22 PROCEDURE — 3046F HEMOGLOBIN A1C LEVEL >9.0%: CPT | Performed by: FAMILY MEDICINE

## 2024-05-22 PROCEDURE — 1036F TOBACCO NON-USER: CPT | Performed by: FAMILY MEDICINE

## 2024-05-22 PROCEDURE — 3075F SYST BP GE 130 - 139MM HG: CPT | Performed by: FAMILY MEDICINE

## 2024-05-22 PROCEDURE — 99215 OFFICE O/P EST HI 40 MIN: CPT | Performed by: FAMILY MEDICINE

## 2024-05-22 PROCEDURE — 3078F DIAST BP <80 MM HG: CPT | Performed by: FAMILY MEDICINE

## 2024-05-22 PROCEDURE — 1159F MED LIST DOCD IN RCRD: CPT | Performed by: FAMILY MEDICINE

## 2024-05-22 PROCEDURE — 3048F LDL-C <100 MG/DL: CPT | Performed by: FAMILY MEDICINE

## 2024-05-22 PROCEDURE — 3008F BODY MASS INDEX DOCD: CPT | Performed by: FAMILY MEDICINE

## 2024-05-22 PROCEDURE — 1160F RVW MEDS BY RX/DR IN RCRD: CPT | Performed by: FAMILY MEDICINE

## 2024-05-22 RX ORDER — EZETIMIBE 10 MG/1
10 TABLET ORAL DAILY
Qty: 90 TABLET | Refills: 2 | Status: SHIPPED | OUTPATIENT
Start: 2024-05-22

## 2024-05-22 RX ORDER — PRAVASTATIN SODIUM 40 MG/1
40 TABLET ORAL NIGHTLY
Qty: 90 TABLET | Refills: 2 | Status: SHIPPED | OUTPATIENT
Start: 2024-05-22

## 2024-05-22 RX ORDER — METOPROLOL SUCCINATE 25 MG/1
25 TABLET, EXTENDED RELEASE ORAL DAILY
Qty: 90 TABLET | Refills: 2 | Status: SHIPPED | OUTPATIENT
Start: 2024-05-22 | End: 2024-05-30 | Stop reason: ALTCHOICE

## 2024-05-22 RX ORDER — METFORMIN HYDROCHLORIDE 1000 MG/1
1000 TABLET ORAL
Qty: 60 TABLET | Refills: 3 | Status: SHIPPED | OUTPATIENT
Start: 2024-05-22

## 2024-05-22 RX ORDER — LOSARTAN POTASSIUM 50 MG/1
50 TABLET ORAL DAILY
Qty: 90 TABLET | Refills: 2 | Status: SHIPPED | OUTPATIENT
Start: 2024-05-22

## 2024-05-22 NOTE — PROGRESS NOTES
General Medical Management Note    64 y.o. female presents for Medical Management  HPI    Diarrhea and nausea daily.  On metformin but won't stop taking it to determine if metformin is causing diarrhea.  Immodium helpful.  Also takes Macrobid daily for UTI prophylaxis for approx 6 months straight.  Having vag yeast infections.      GYN monitoring Uterine CA - noted vaginal yeast infect    DM2 -continues to use metformin despite diarrhea.  rybelsus very expensive.      Weight management: Patient is successful with weight loss    Past Medical History:   Diagnosis Date    Arthritis 2022    Cancer (Multi) May 2022    Diabetes mellitus (Multi) 2022    GERD (gastroesophageal reflux disease) 2021    Hypertension Unknown     Urinary incontinence     Urinary tract infection     Uterine cancer (Multi) May 2022      Past Surgical History:   Procedure Laterality Date     SECTION, LOW TRANSVERSE  3-12[birth year]83 & 1987    CHOLECYSTECTOMY  2013    HYSTERECTOMY  2022    TUBAL LIGATION  1987     Family History   Problem Relation Name Age of Onset    Cancer Father Bryson Carrillo     Cancer Maternal Grandmother Florecita Bennett     Cancer Mother's Brother Lefty Bennett     Cancer Mother's Brother Twin Bennett       Social History     Socioeconomic History    Marital status:      Spouse name: Not on file    Number of children: Not on file    Years of education: Not on file    Highest education level: Not on file   Occupational History    Not on file   Tobacco Use    Smoking status: Former     Current packs/day: 0.00     Average packs/day: 0.5 packs/day for 10.0 years (5.0 ttl pk-yrs)     Types: Cigarettes     Start date: 1977     Quit date: 3/10/1987     Years since quittin.2    Smokeless tobacco: Never    Tobacco comments:     Quit 1987.  The day I found out i was pregnant with my son   Substance and Sexual Activity    Alcohol use: Never    Drug use: Never    Sexual  activity: Not Currently     Partners: Male     Birth control/protection: Abstinence   Other Topics Concern    Not on file   Social History Narrative    Not on file     Social Determinants of Health     Financial Resource Strain: Not on file   Food Insecurity: Not on file   Transportation Needs: Not on file   Physical Activity: Not on file   Stress: Not on file   Social Connections: Not on file   Intimate Partner Violence: Not on file   Housing Stability: Not on file       Current Outpatient Medications on File Prior to Visit   Medication Sig Dispense Refill    cholecalciferol (Vitamin D-3) 25 MCG (1000 UT) capsule Take by mouth once daily.      ezetimibe (Zetia) 10 mg tablet Take 1 tablet (10 mg) by mouth once daily.      fluconazole (Diflucan) 150 mg tablet Take 1 tablet (150 mg) by mouth 1 time for 1 dose. 1 tablet 0    losartan (Cozaar) 50 mg tablet Take 1 tablet (50 mg) by mouth once daily.      metFORMIN (Glucophage) 1,000 mg tablet Take 1 tablet (1,000 mg) by mouth 2 times a day with meals. 60 tablet 3    metoprolol succinate XL (Toprol-XL) 25 mg 24 hr tablet Take 1 tablet (25 mg) by mouth once daily. Do not crush or chew.      multivitamin tablet Take 1 tablet by mouth once daily.      nitrofurantoin, macrocrystal-monohydrate, (Macrobid) 100 mg capsule Take 1 capsule (100 mg) by mouth once daily. 90 capsule 1    pravastatin (Pravachol) 40 mg tablet Take 1 tablet (40 mg) by mouth once daily at bedtime.      vits A,C,E/lutein/minerals (OCUVITE WITH LUTEIN ORAL) Take by mouth.      metFORMIN, OSM, (Fortamet) 1,000 mg 24 hr tablet Take 1 tablet (1,000 mg) by mouth once daily in the evening. Take with meals. Do not crush, chew, or split.      saccharomyces boulardii (Florastor) 250 mg capsule Take 1 capsule (250 mg) by mouth 2 times a day.       No current facility-administered medications on file prior to visit.       Allergies   Allergen Reactions    Lactose Diarrhea         ROS: Denies chest pain, SOB,  "Headache, vision problems     Visit Vitals  /78   Pulse 100   Ht 1.651 m (5' 5\")   Wt 133 kg (294 lb)   SpO2 97%   BMI 48.92 kg/m²   Smoking Status Former   BSA 2.47 m²        PHYSICAL EXAM:  Alert and oriented x3.  Eyes: EOM grossly intact  Neck supple without lymph adenopathy or carotid bruit.  No masses or thyromegaly  Heart regular rate and rhythm without murmur.  Lungs clear to auscultation.  Abdomen soft with normal bowel sounds  Legs without edema.  Gait is non-antalgic  Speech clear.  Hearing adequate.          DIAGNOSIS/PLAN:  1. Diarrhea of presumed infectious origin  - C. difficile, PCR    2. Type 2 diabetes mellitus with hyperglycemia, without long-term current use of insulin (Multi)  -Will discontinue metformin in 1 week if diarrhea has not resolved by discontinuing Macrobid.  Consider adding Actos, Jardiance, etc.  -Patient will send Latio message with progress.      - Comprehensive Metabolic Panel; Future  - POCT glycosylated hemoglobin (Hb A1C) manually resulted  - metFORMIN (Glucophage) 1,000 mg tablet; Take 1 tablet (1,000 mg) by mouth 2 times daily (morning and late afternoon).  Dispense: 60 tablet; Refill: 3    3. Hyperlipidemia, unspecified hyperlipidemia type  - ezetimibe (Zetia) 10 mg tablet; Take 1 tablet (10 mg) by mouth once daily.  Dispense: 90 tablet; Refill: 2  - pravastatin (Pravachol) 40 mg tablet; Take 1 tablet (40 mg) by mouth once daily at bedtime.  Dispense: 90 tablet; Refill: 2    4. Hypertension, unspecified type  - losartan (Cozaar) 50 mg tablet; Take 1 tablet (50 mg) by mouth once daily.  Dispense: 90 tablet; Refill: 2  - metoprolol succinate XL (Toprol-XL) 25 mg 24 hr tablet; Take 1 tablet (25 mg) by mouth once daily. Do not crush or chew.  Dispense: 90 tablet; Refill: 2    5. Class 3 severe obesity due to excess calories with serious comorbidity and body mass index (BMI) of 45.0 to 49.9 in adult (Multi)  Patient encouraged to commit to a diet of lower carbohydrates " and increase vegetable and fruit intake. Patient also encouraged to increase water intake to 80 ounces/day. Continue exercise for at least 30 minutes a day on most days of the week.  Sustained obesity leads to increased risk for multiple medical problems including heart attack, stroke, cancer and infection.  For more assistance and weight loss options, go to the website: Yourweightmatters.org.  Additional resources:  RethinkObesity.com, obesity.org, obesityaction.org, Culturalitep.com    6. Recurrent UTI (urinary tract infection)  Start Macrobid, continue Diflucan and probiotic.  Check stool for C. difficile infection  If recurrent UTI is a problem and there is no surgical option, can consider low-dose antibiotic every other day.  Antibiotic should be changed every 3 months.        Follow up in 3 months for diabetes management and recheck and diarrhea; 6 months for annual comprehensive medical evaluation    I will continue to monitor, evaluate, assess and treat all problems/diagnoses as appropriate and continue to collaborate with specialists.    Contact office or send a  MY Chart message with any questions or concerns    Encouraged to sign up with my  My Chart  Patient will only be notified of labs that require medical intervention.    Prescriptions will not be filled unless you are compliant with your follow up appointments or have a follow up appointment scheduled as per instruction of your physician. Refills should be requested at the time of your visit.    **Charting was completed using voice recognition technology and may include unintended errors**    Bryson Mejia DO, KATRIN  [address] [id number]  [place] [address] [phone]  Bryson Mejia DO, FACOFP

## 2024-05-30 DIAGNOSIS — I10 HYPERTENSION, UNSPECIFIED TYPE: Primary | ICD-10-CM

## 2024-05-30 RX ORDER — METOPROLOL SUCCINATE 50 MG/1
50 TABLET, EXTENDED RELEASE ORAL DAILY
Start: 2024-05-30 | End: 2024-11-26

## 2024-06-06 DIAGNOSIS — R11.2 NAUSEA AND VOMITING, UNSPECIFIED VOMITING TYPE: Primary | ICD-10-CM

## 2024-06-06 RX ORDER — ONDANSETRON 8 MG/1
8 TABLET, ORALLY DISINTEGRATING ORAL EVERY 8 HOURS PRN
Qty: 30 TABLET | Refills: 0 | Status: SHIPPED | OUTPATIENT
Start: 2024-06-06 | End: 2024-07-06

## 2024-08-10 RX ORDER — IBUPROFEN 600 MG/1
1 TABLET ORAL EVERY 6 HOURS PRN
COMMUNITY
Start: 2024-07-10

## 2024-08-19 ASSESSMENT — ENCOUNTER SYMPTOMS
HEADACHES: 0
WEIGHT LOSS: 0
NERVOUS/ANXIOUS: 0
FATIGUE: 0
HUNGER: 0
POLYPHAGIA: 0
VISUAL CHANGE: 0
TREMORS: 0
WEAKNESS: 0
POLYDIPSIA: 0
BLACKOUTS: 0
CONFUSION: 0
SPEECH DIFFICULTY: 0
BLURRED VISION: 0
DIZZINESS: 0
SWEATS: 0
SEIZURES: 0

## 2024-08-26 ENCOUNTER — APPOINTMENT (OUTPATIENT)
Dept: PRIMARY CARE | Facility: CLINIC | Age: 64
End: 2024-08-26
Payer: COMMERCIAL

## 2024-08-26 VITALS
OXYGEN SATURATION: 98 % | BODY MASS INDEX: 47.98 KG/M2 | HEIGHT: 65 IN | HEART RATE: 86 BPM | DIASTOLIC BLOOD PRESSURE: 78 MMHG | WEIGHT: 288 LBS | SYSTOLIC BLOOD PRESSURE: 134 MMHG

## 2024-08-26 DIAGNOSIS — E11.9 TYPE 2 DIABETES MELLITUS WITHOUT COMPLICATION, WITHOUT LONG-TERM CURRENT USE OF INSULIN (MULTI): Primary | ICD-10-CM

## 2024-08-26 DIAGNOSIS — N39.0 RECURRENT UTI (URINARY TRACT INFECTION): ICD-10-CM

## 2024-08-26 DIAGNOSIS — R35.0 URINARY FREQUENCY: ICD-10-CM

## 2024-08-26 DIAGNOSIS — R19.7 DIARRHEA, UNSPECIFIED TYPE: ICD-10-CM

## 2024-08-26 DIAGNOSIS — E66.01 CLASS 3 SEVERE OBESITY DUE TO EXCESS CALORIES WITH SERIOUS COMORBIDITY AND BODY MASS INDEX (BMI) OF 45.0 TO 49.9 IN ADULT (MULTI): ICD-10-CM

## 2024-08-26 LAB — POC HEMOGLOBIN A1C: 7.3 % (ref 4.2–6.5)

## 2024-08-26 PROCEDURE — 3075F SYST BP GE 130 - 139MM HG: CPT | Performed by: FAMILY MEDICINE

## 2024-08-26 PROCEDURE — 1159F MED LIST DOCD IN RCRD: CPT | Performed by: FAMILY MEDICINE

## 2024-08-26 PROCEDURE — 3078F DIAST BP <80 MM HG: CPT | Performed by: FAMILY MEDICINE

## 2024-08-26 PROCEDURE — 4010F ACE/ARB THERAPY RXD/TAKEN: CPT | Performed by: FAMILY MEDICINE

## 2024-08-26 PROCEDURE — 3048F LDL-C <100 MG/DL: CPT | Performed by: FAMILY MEDICINE

## 2024-08-26 PROCEDURE — 83036 HEMOGLOBIN GLYCOSYLATED A1C: CPT | Performed by: FAMILY MEDICINE

## 2024-08-26 PROCEDURE — 1036F TOBACCO NON-USER: CPT | Performed by: FAMILY MEDICINE

## 2024-08-26 PROCEDURE — 1160F RVW MEDS BY RX/DR IN RCRD: CPT | Performed by: FAMILY MEDICINE

## 2024-08-26 PROCEDURE — 3046F HEMOGLOBIN A1C LEVEL >9.0%: CPT | Performed by: FAMILY MEDICINE

## 2024-08-26 PROCEDURE — 99214 OFFICE O/P EST MOD 30 MIN: CPT | Performed by: FAMILY MEDICINE

## 2024-08-26 PROCEDURE — 3008F BODY MASS INDEX DOCD: CPT | Performed by: FAMILY MEDICINE

## 2024-08-26 RX ORDER — OXYBUTYNIN CHLORIDE 10 MG/1
10 TABLET, EXTENDED RELEASE ORAL DAILY
Qty: 90 TABLET | Refills: 2 | Status: SHIPPED | OUTPATIENT
Start: 2024-08-26 | End: 2025-05-23

## 2024-08-26 RX ORDER — SOLIFENACIN SUCCINATE 10 MG/1
10 TABLET, FILM COATED ORAL DAILY
Qty: 90 TABLET | Refills: 2 | Status: CANCELLED | OUTPATIENT
Start: 2024-08-26 | End: 2025-05-23

## 2024-08-26 NOTE — PROGRESS NOTES
"Subjective     Patient ID: [id number]     Jackie Malagon is a 64 y.o. female who presents for 3 month follow up diabetes.    -DM2: Pt states that home BS monitoring is less than 140s. Pt checks her BS every other Sunday. Pt denies any s/s of hyper- or hypoglycemia. Pt's A1c today is 7.3. Pt has been doing chair exercises. Pt continues to use Metformin and has diarrhea. Pt statest that sometimes she gets relief times 1 week.     -Diarrhea: Pt states that she has always had diarrhea since she was a child. Pt feels that her diarrhea is stress related. Pt has been taking immodium as needed. Pt states that she takes it 1 time every other day while working away from home. 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 Pt stopped taking the probiotic, she states that it made her diarrhea worst.     -Recurrent UTIs: Pt has stopped taking macrobid. Denies having a UTI since stopping. Pt has not been walking down the street for exercise due to urinary frequency, urgency, and incontinence. Pt states that in the past she took Detrol LA and it was helpful but expensive. Pt states that she goes to the restroom 3-4 times a night.    Obesity: Pt does not follow any specific dietary restrictions. Pt does chair exercises.     Objective   /78   Pulse 86   Ht 1.651 m (5' 5\")   Wt 131 kg (288 lb)   SpO2 98%   BMI 47.93 kg/m²      Physical Exam:     Alert and oriented x3.  Eyes: EOM grossly intact  Gait is non-antalgic  Speech clear.  Hearing adequate.       Assessment/Plan     1. Type 2 diabetes mellitus without complication, without long-term current use of insulin (Multi)    - POCT glycosylated hemoglobin (Hb A1C) manually resulted  -Diabetes:  Patient aware of diabetes goals including hemoglobin A1c less than 7%, blood pressure less than 130/80, and LDL cholesterol less than 100.  Discussed importance of daily exercise as well as following an American diabetes Association diet for best control.  Also discussed potential " complications due to uncontrolled diabetes including heart attack, stroke, kidney failure, peripheral neuropathy, impaired vision and amputations     2. Diarrhea, unspecified type    -Continue to use imodium as needed.     3. Urinary frequency    -Initiate Ditropan this visit.     4. Class 3 severe obesity due to excess calories with serious comorbidity and body mass index (BMI) of 45.0 to 49.9 in adult (Multi)    -Weight Management:        Patient encouraged to commit to a diet of lower carbohydrates and increase vegetable and fruit intake. Patient also encouraged to increase water intake to 80 ounces/day. Continue exercise for at least 30 minutes a day on most days of the week.  Sustained obesity leads to increased risk for multiple medical problems including heart attack, stroke, cancer and infection.  For more assistance and weight loss options, go to the website: Yourweightmatters.org.  Additional resources:  RethinkObesity.com, obesity.org, obesityaction.org, Wyle.com     5. Recurrent UTI (urinary tract infection)    -Continue to take cranberry capsules.          Return to office in November for comprehensive medical evaluation, long-term medication use monitoring, and preventative services screening    Will continue to monitor, evaluate, assess and treat all problems/diagnoses as appropriate and continue to collaborate with specialists.    Encouraged to sign up with  [employer]    Contact office or send a  [employer] message with any questions or concerns    Patient will only be notified of labs that require medical intervention.    Prescriptions will not be filled unless you are compliant with your follow up appointments or have a follow up appointment scheduled as per instruction of your physician. Refills should be requested at the time of your visit.    **Charting was completed using voice recognition technology and may include unintended errors**    Documentation in part by Ladi Karu, RN, NP  student, [facility]    I saw and evaluated the patient. I personally obtained the key and critical portions of the history and physical exam or was physically present for key and critical portions performed by the nurse practitioner student. I reviewed the documentation and discussed the patient with the nurse practitioner student.  I was directly involved with the history, exam and medical decision making and agree with the medical decision making as documented in the note.    Bryson Mejia DO, FACOFP  Senior Attending Physician  [facility]  [address], [id number]  [place] [address] [phone]

## 2024-08-28 ENCOUNTER — TELEMEDICINE (OUTPATIENT)
Dept: PHARMACY | Facility: HOSPITAL | Age: 64
End: 2024-08-28
Payer: COMMERCIAL

## 2024-08-28 DIAGNOSIS — E11.65 TYPE 2 DIABETES MELLITUS WITH HYPERGLYCEMIA, WITHOUT LONG-TERM CURRENT USE OF INSULIN (MULTI): Primary | ICD-10-CM

## 2024-08-28 DIAGNOSIS — E11.9 TYPE 2 DIABETES MELLITUS WITHOUT COMPLICATION, WITHOUT LONG-TERM CURRENT USE OF INSULIN (MULTI): ICD-10-CM

## 2024-08-28 ASSESSMENT — ENCOUNTER SYMPTOMS
BLURRED VISION: 1
FATIGUE: 0
VISUAL CHANGE: 1
POLYDIPSIA: 1
WEIGHT LOSS: 0
WEAKNESS: 0
POLYPHAGIA: 0

## 2024-08-28 NOTE — PROGRESS NOTES
[facility] Pharmacy Consult  Jackie Malagon is a 64 y.o. female was referred to Clinical Pharmacy Team for a Pharmacy consult.  The patient was referred for their Diabetes.    Referring Provider: Bryson Mejia DO    Subjective   Allergies   Allergen Reactions    Lactose Diarrhea       [id number] - [place], [place] - [address]  [address] [address]  Phone: [phone] Fax: [phone]    [facility]  [address], [address]  [place] [address]  Phone: [phone] Fax: [phone]      Diabetes  She presents for her initial diabetic visit. She has type 2 diabetes mellitus. Her disease course has been stable. There are no hypoglycemic associated symptoms. Associated symptoms include blurred vision, polydipsia, polyuria and visual change. Pertinent negatives for diabetes include no chest pain, no fatigue, no foot paresthesias, no foot ulcerations, no polyphagia, no weakness and no weight loss. There are no hypoglycemic complications. Symptoms are stable. There are no diabetic complications. Risk factors for coronary artery disease include hypertension, obesity, dyslipidemia, diabetes mellitus and family history. Current diabetic treatment includes oral agent (monotherapy). She is compliant with treatment all of the time. She is following a generally healthy diet. She has not had a previous visit with a dietitian. She rarely participates in exercise. An ACE inhibitor/angiotensin II receptor blocker is being taken. Eye exam is not current.     Takes her metformin with food. Measures blood sugar periodically. Having diarrhea and requires her to often stay near a restroom. Did a trial off metformin in the past but it did not resolve the diarrhea.     Review of Systems   Constitutional:  Negative for fatigue and weight loss.   Eyes:  Positive for blurred vision.   Cardiovascular:  Negative for chest pain.   Endocrine: Positive for polydipsia and polyuria. Negative  for polyphagia.   Neurological:  Negative for weakness.       Objective     There were no vitals taken for this visit.     LAB  Lab Results   Component Value Date    BILITOT 0.6 05/17/2024    CALCIUM 9.4 05/17/2024    CO2 25 05/17/2024     05/17/2024    CREATININE 0.97 05/17/2024    GLUCOSE 134 (H) 05/17/2024    ALKPHOS 80 05/17/2024    K 4.5 05/17/2024    PROT 7.2 05/17/2024     05/17/2024    AST 43 (H) 05/17/2024    ALT 36 05/17/2024    BUN 18 05/17/2024    ANIONGAP 16 05/17/2024    ALBUMIN 3.9 05/17/2024    GFRF 68 05/25/2022     Lab Results   Component Value Date    TRIG 201 (H) 02/24/2024    CHOL 161 02/24/2024    LDLCALC 70 02/24/2024    HDL 50.6 02/24/2024     Lab Results   Component Value Date    HGBA1C 7.3 (A) 08/26/2024       Current Outpatient Medications on File Prior to Visit   Medication Sig Dispense Refill    cholecalciferol (Vitamin D-3) 25 MCG (1000 UT) capsule Take by mouth once daily.      ezetimibe (Zetia) 10 mg tablet Take 1 tablet (10 mg) by mouth once daily. 90 tablet 2     mg tablet Take 1 tablet (600 mg) by mouth every 6 hours if needed for pain.      losartan (Cozaar) 50 mg tablet Take 1 tablet (50 mg) by mouth once daily. 90 tablet 2    metFORMIN (Glucophage) 1,000 mg tablet Take 1 tablet (1,000 mg) by mouth 2 times daily (morning and late afternoon). 60 tablet 3    metoprolol succinate XL (Toprol-XL) 50 mg 24 hr tablet Take 1 tablet (50 mg) by mouth once daily. Do not crush or chew. Rx by Dr. Vinh Zapata      multivitamin tablet Take 1 tablet by mouth once daily.      oxybutynin XL (Ditropan XL) 10 mg 24 hr tablet Take 1 tablet (10 mg) by mouth once daily. Do not crush, chew, or split. 90 tablet 2    pravastatin (Pravachol) 40 mg tablet Take 1 tablet (40 mg) by mouth once daily at bedtime. 90 tablet 2    saccharomyces boulardii (Florastor) 250 mg capsule Take 1 capsule (250 mg) by mouth 2 times a day.      vits A,C,E/lutein/minerals (OCUVITE WITH LUTEIN ORAL) Take by  mouth.       No current facility-administered medications on file prior to visit.        HISTORICAL PHARMACOTHERAPY  -Tried Rybelsus in the past: did not cause diarrhea    DRUG INTERACTIONS  - none    SECONDARY PREVENTION  - Statin? yes  - ACE-I/ARB? yes    Assessment/Plan   Problem List Items Addressed This Visit       Type 2 diabetes mellitus with hyperglycemia, without long-term current use of insulin (Multi) - Primary     START Rybelsus 3mg po daily  Prescription for the following were sent to patient's preferred  Pharmacy:  Rybelsus 3mg po daily  Educated and discuss with patient the benefits and risk of starting GLP-1 for weight loss/DM  Educated patient that to maximize weight loss and prevent return weight gain, patient has to combine GLP-1 with good diet and exercise  DASH Diet  150 mins per week of exercise  Educated patient on the side effects of GLP-1: N/V, stomach upset, diarrhea, stomach pain, etc. Patient instructed to call should there be any questions or concerns.  Discussed in depth benefits of  Patient Assistance Program ( PAP) to help reduce the cost of selected patient's medication to $0 COPAY. Informed patient that the process may take 2-3 weeks for an outcome. Patient is aware they are responsible for any copay amount, should they decide to fill the medication before approval.  Will send [facility] 340b Eligible prescription to the patient's Preferred  Pharmacy for  PAP:  Rybelsus  FUV with pharmacy in 4 weeks to assess tolerance and titration          Other Visit Diagnoses       Type 2 diabetes mellitus without complication, without long-term current use of insulin (Multi)        Relevant Medications    semaglutide (Rybelsus) 3 mg tablet             Continue all meds under the continuation of care with the referring provider and clinical pharmacy team.    Eduardo Walker PharmD     Verbal consent to manage patient's drug therapy was obtained from [the patient and/or an individual authorized  to act on behalf of a patient]. They were informed they may decline to participate or withdraw from participation in pharmacy services at any time.

## 2024-08-28 NOTE — PROGRESS NOTES
"Dear Jackie Malagon,    Thank you for engaging in your appointment with Eduardo Walker PharmD on 08/28/24! As discussed, we will be submitting an application for [facility] Patient Assistance Program to help with the cost of the following brand name and high-copay medications.    1. Rybelsus    Please provide the following information for the application:  Tax form 1040  If you do not file taxes, please provide social security statements and signed “statement of non-filing”  If you have experienced a recent significant event affecting income:  Signed \"Attestation of Life Qualifying Event\"  Documentation of event and changes in income    You may provide these documents via any of the following methods:  Email to [email or web address]  Fax to [phone]  Drop off to staff at Dr. Mejia's office or  pharmacy  Mail to [facility] at [address] [facility] [address] [place] [address]    Once we receive this documentation, the approval process will begin and may take up to 6 weeks. If approved, medication must be filled through [facility] pharmacy and may be picked up or mailed to your address. The pharmacy will contact you to discuss how you will receive your prescription.    Thank you,  Eduardo Walker PharmD    [facility]  [facility] [address] [address]  [phone]   "

## 2024-08-29 ENCOUNTER — TELEPHONE (OUTPATIENT)
Dept: OBSTETRICS AND GYNECOLOGY | Facility: CLINIC | Age: 64
End: 2024-08-29
Payer: COMMERCIAL

## 2024-08-29 PROCEDURE — RXMED WILLOW AMBULATORY MEDICATION CHARGE

## 2024-08-30 NOTE — ASSESSMENT & PLAN NOTE
START Rybelsus 3mg po daily  Prescription for the following were sent to patient's preferred  Pharmacy:  Rybelsus 3mg po daily  Educated and discuss with patient the benefits and risk of starting GLP-1 for weight loss/DM  Educated patient that to maximize weight loss and prevent return weight gain, patient has to combine GLP-1 with good diet and exercise  DASH Diet  150 mins per week of exercise  Educated patient on the side effects of GLP-1: N/V, stomach upset, diarrhea, stomach pain, etc. Patient instructed to call should there be any questions or concerns.  Discussed in depth benefits of  Patient Assistance Program ( PAP) to help reduce the cost of selected patient's medication to $0 COPAY. Informed patient that the process may take 2-3 weeks for an outcome. Patient is aware they are responsible for any copay amount, should they decide to fill the medication before approval.  Will send WEARN 340b Eligible prescription to the patient's Preferred  Pharmacy for  PAP:  Rybelsus  FUV with pharmacy in 4 weeks to assess tolerance and titration

## 2024-08-31 ENCOUNTER — PHARMACY VISIT (OUTPATIENT)
Dept: PHARMACY | Facility: CLINIC | Age: 64
End: 2024-08-31

## 2024-08-31 ENCOUNTER — PHARMACY VISIT (OUTPATIENT)
Dept: PHARMACY | Facility: CLINIC | Age: 64
End: 2024-08-31
Payer: COMMERCIAL

## 2024-09-03 DIAGNOSIS — N39.0 RECURRENT UTI: ICD-10-CM

## 2024-09-18 ENCOUNTER — APPOINTMENT (OUTPATIENT)
Dept: PHARMACY | Facility: HOSPITAL | Age: 64
End: 2024-09-18
Payer: COMMERCIAL

## 2024-09-18 DIAGNOSIS — E11.9 TYPE 2 DIABETES MELLITUS WITHOUT COMPLICATION, WITHOUT LONG-TERM CURRENT USE OF INSULIN (MULTI): Primary | ICD-10-CM

## 2024-09-18 DIAGNOSIS — E11.65 TYPE 2 DIABETES MELLITUS WITH HYPERGLYCEMIA, WITHOUT LONG-TERM CURRENT USE OF INSULIN: ICD-10-CM

## 2024-09-18 ASSESSMENT — ENCOUNTER SYMPTOMS: DIABETIC ASSOCIATED SYMPTOMS: 0

## 2024-09-18 NOTE — PROGRESS NOTES
[facility] Pharmacy Consult  Jackie Malagon is a 64 y.o. female was referred to Clinical Pharmacy Team for a Pharmacy consult.  The patient was referred for their Diabetes.    Referring Provider: Bryson Mejia DO    Subjective   Allergies   Allergen Reactions    Lactose Diarrhea       [id number] - [place], [place] - [address]  [address] [place] [address]  Phone: [phone] Fax: [phone]    [facility]  [address], [address]  [place] [id number]  Phone: [phone] Fax: [phone]      Diabetes  She presents for her follow-up diabetic visit. She has type 2 diabetes mellitus. Her disease course has been stable. There are no hypoglycemic associated symptoms. There are no diabetic associated symptoms. There are no hypoglycemic complications. Symptoms are stable. There are no diabetic complications. Risk factors for coronary artery disease include diabetes mellitus, dyslipidemia and hypertension. She is compliant with treatment all of the time.     Reported nausea for 1-2 days on the first start. Still happening with each dose. Noticed some weight loss. Not ready to move up dose yet.    Review of Systems    Objective     There were no vitals taken for this visit.     LAB  Lab Results   Component Value Date    BILITOT 0.6 05/17/2024    CALCIUM 9.4 05/17/2024    CO2 25 05/17/2024     05/17/2024    CREATININE 0.97 05/17/2024    GLUCOSE 134 (H) 05/17/2024    ALKPHOS 80 05/17/2024    K 4.5 05/17/2024    PROT 7.2 05/17/2024     05/17/2024    AST 43 (H) 05/17/2024    ALT 36 05/17/2024    BUN 18 05/17/2024    ANIONGAP 16 05/17/2024    ALBUMIN 3.9 05/17/2024    GFRF 68 05/25/2022     Lab Results   Component Value Date    TRIG 201 (H) 02/24/2024    CHOL 161 02/24/2024    LDLCALC 70 02/24/2024    HDL 50.6 02/24/2024     Lab Results   Component Value Date    HGBA1C 7.3 (A) 08/26/2024       Current Outpatient Medications on File Prior to Visit   Medication Sig  Dispense Refill    cholecalciferol (Vitamin D-3) 25 MCG (1000 UT) capsule Take by mouth once daily.      ezetimibe (Zetia) 10 mg tablet Take 1 tablet (10 mg) by mouth once daily. 90 tablet 2     mg tablet Take 1 tablet (600 mg) by mouth every 6 hours if needed for pain.      losartan (Cozaar) 50 mg tablet Take 1 tablet (50 mg) by mouth once daily. 90 tablet 2    metFORMIN (Glucophage) 1,000 mg tablet Take 1 tablet (1,000 mg) by mouth 2 times daily (morning and late afternoon). 60 tablet 3    metoprolol succinate XL (Toprol-XL) 50 mg 24 hr tablet Take 1 tablet (50 mg) by mouth once daily. Do not crush or chew. Rx by Dr. Vinh Zapata      multivitamin tablet Take 1 tablet by mouth once daily.      oxybutynin XL (Ditropan XL) 10 mg 24 hr tablet Take 1 tablet (10 mg) by mouth once daily. Do not crush, chew, or split. 90 tablet 2    pravastatin (Pravachol) 40 mg tablet Take 1 tablet (40 mg) by mouth once daily at bedtime. 90 tablet 2    saccharomyces boulardii (Florastor) 250 mg capsule Take 1 capsule (250 mg) by mouth 2 times a day.      semaglutide (Rybelsus) 3 mg tablet Take 1 tablet (3 mg) by mouth once daily. 30 tablet 11    vits A,C,E/lutein/minerals (OCUVITE WITH LUTEIN ORAL) Take by mouth.       No current facility-administered medications on file prior to visit.        HISTORICAL PHARMACOTHERAPY  -none    DRUG INTERACTIONS  - none    SECONDARY PREVENTION  - Statin? yes  - ACE-I/ARB? yes    Assessment/Plan   Problem List Items Addressed This Visit       Type 2 diabetes mellitus with hyperglycemia, without long-term current use of insulin (Multi)     CONTINUE all medications  FUV in 4 weeks          Other Visit Diagnoses       Type 2 diabetes mellitus without complication, without long-term current use of insulin (Multi)    -  Primary             Continue all meds under the continuation of care with the referring provider and clinical pharmacy team.    Eduardo Walker, Beth     Verbal consent to manage patient's  drug therapy was obtained from [the patient and/or an individual authorized to act on behalf of a patient]. They were informed they may decline to participate or withdraw from participation in pharmacy services at any time.

## 2024-09-23 PROCEDURE — RXMED WILLOW AMBULATORY MEDICATION CHARGE

## 2024-09-25 ENCOUNTER — PHARMACY VISIT (OUTPATIENT)
Dept: PHARMACY | Facility: CLINIC | Age: 64
End: 2024-09-25
Payer: COMMERCIAL

## 2024-09-28 ENCOUNTER — APPOINTMENT (OUTPATIENT)
Dept: RADIOLOGY | Facility: CLINIC | Age: 64
End: 2024-09-28
Payer: COMMERCIAL

## 2024-10-07 ENCOUNTER — APPOINTMENT (OUTPATIENT)
Dept: OBSTETRICS AND GYNECOLOGY | Facility: CLINIC | Age: 64
End: 2024-10-07
Payer: COMMERCIAL

## 2024-10-07 VITALS
BODY MASS INDEX: 46.98 KG/M2 | SYSTOLIC BLOOD PRESSURE: 114 MMHG | DIASTOLIC BLOOD PRESSURE: 84 MMHG | HEIGHT: 65 IN | WEIGHT: 282 LBS

## 2024-10-07 DIAGNOSIS — N32.81 OAB (OVERACTIVE BLADDER): Primary | ICD-10-CM

## 2024-10-07 DIAGNOSIS — R35.0 URINARY FREQUENCY: ICD-10-CM

## 2024-10-07 PROCEDURE — G2211 COMPLEX E/M VISIT ADD ON: HCPCS | Performed by: STUDENT IN AN ORGANIZED HEALTH CARE EDUCATION/TRAINING PROGRAM

## 2024-10-07 PROCEDURE — 3079F DIAST BP 80-89 MM HG: CPT | Performed by: STUDENT IN AN ORGANIZED HEALTH CARE EDUCATION/TRAINING PROGRAM

## 2024-10-07 PROCEDURE — 3074F SYST BP LT 130 MM HG: CPT | Performed by: STUDENT IN AN ORGANIZED HEALTH CARE EDUCATION/TRAINING PROGRAM

## 2024-10-07 PROCEDURE — 3008F BODY MASS INDEX DOCD: CPT | Performed by: STUDENT IN AN ORGANIZED HEALTH CARE EDUCATION/TRAINING PROGRAM

## 2024-10-07 PROCEDURE — 4010F ACE/ARB THERAPY RXD/TAKEN: CPT | Performed by: STUDENT IN AN ORGANIZED HEALTH CARE EDUCATION/TRAINING PROGRAM

## 2024-10-07 PROCEDURE — 3046F HEMOGLOBIN A1C LEVEL >9.0%: CPT | Performed by: STUDENT IN AN ORGANIZED HEALTH CARE EDUCATION/TRAINING PROGRAM

## 2024-10-07 PROCEDURE — 1036F TOBACCO NON-USER: CPT | Performed by: STUDENT IN AN ORGANIZED HEALTH CARE EDUCATION/TRAINING PROGRAM

## 2024-10-07 PROCEDURE — 99214 OFFICE O/P EST MOD 30 MIN: CPT | Performed by: STUDENT IN AN ORGANIZED HEALTH CARE EDUCATION/TRAINING PROGRAM

## 2024-10-07 PROCEDURE — 1126F AMNT PAIN NOTED NONE PRSNT: CPT | Performed by: STUDENT IN AN ORGANIZED HEALTH CARE EDUCATION/TRAINING PROGRAM

## 2024-10-07 PROCEDURE — 3048F LDL-C <100 MG/DL: CPT | Performed by: STUDENT IN AN ORGANIZED HEALTH CARE EDUCATION/TRAINING PROGRAM

## 2024-10-07 RX ORDER — MIRABEGRON 25 MG/1
25 TABLET, FILM COATED, EXTENDED RELEASE ORAL DAILY
Qty: 30 TABLET | Refills: 2 | Status: SHIPPED | OUTPATIENT
Start: 2024-10-07 | End: 2025-01-05

## 2024-10-07 ASSESSMENT — PAIN SCALES - GENERAL: PAINLEVEL: 0-NO PAIN

## 2024-10-07 NOTE — PROGRESS NOTES
New pt here for urinary frequency. Pt unable to void  PVR = 25ml    Chaperone declined: Rosalva Finley, CM3      Referred by: Dr. Mejia     PCP  Bryson Mejia DO         CHIEF COMPLAINT:  urinary frequency         HISTORY OF PRESENT ILLNESS:  This is a  64 y.o. y.o. female who presents with concerns of stress and urge incontinence. Patient was previously on Oxybutynin that she self discontinued in 2023 due to cost, worsening frequency and urgency and symptoms of stress incontinence, with nocturia 7-8x a night. Patient however has since restarted medication in May, since most recently improving 2-3x during night  .    The following were reviewed to gain additional history:  External notes: Dr. Mejia note 8/26/24, started ditropan, imodium for diarrhea  Alena Simon 3/1/24 (ordered UDS, urgency and moderate stress incontinence - recommended UDS then follow up with Carmencita)  Op note from 5/25/22 (Dr. Feliz)  1. Diagnostic laparoscopy    2. Lysis of adhesions with aborted robotic hysterectomy   3. Total Vaginal hysterectomy  4. Bilateral salpingooophorectomy  5. Cystoscopy    Test results: POCT A1C 7.3% 8/26/24         Specifically, she describes the following pelvic floor symptoms:          Prolapse: No       - Splinting to urinate: No       - Splinting for bowel movement/stool trapping: No              Incontinence:  Yes             Mixed              Urinary Symptoms:       - Frequency:  Yes             # Voids:  5-6x (before oxybutynin), 2-3x (on oxybuytnin)        - Nocturia: Yes             # Voids:  5-6x (before oxybutynin), 2-3x (on oxybutynin)        - Urgency:  Yes       - Incomplete emptying:  No       - Hesitancy:  Yes -        - Pain with voiding:  No       - Excessive fluid intake: No               History:       - Recurrent UTI:  Yes - remote history, last UTI dx early 2024        - Hematuria:  No       - Stones:  Yes - right nephrolithiasis in 5/27/2023        - Kidney Disease:  No                  Bowel  Symptoms:       - Regular: Yes       - Diarrhea:No; remote h/o diarrhea        - Constipation: No       - Fecal Incontinence:  remote episode during diarrhea        - Flatus Incontinence:  yes       - Fecal urgency:   No    Past medical and surgical hx reviewed - pertinent for   PMH HTN, DM, uterine cancer  PSH TVH/BSO for uterine cancer as above, TL, leigha,  x 2  Smoking history: former, 5 pack-year history        Gyn History:  - Menopausal: Yes           Postmenopausal bleeding: h/o FIGO Grade 1 endometrioid adenocarcinoma   - HRT: No  - Pap up to date: Yes -    History of abnormal pap: Yes - remote h/o dypslasia, but last pap nml cytology/ HPV negative  - Sexually active:  No  Dyspareunia: n/a   Other issues: n/a  - Number of prior vaginal deliveries: 1   Number of prior operative deliveries: 0   Prior OASI? no  - Number of prior c-sections: 2    - Mammogram up to date: Yes  - Colonoscopy up to date: Yes    OB History    No obstetric history on file.               PHYSICAL EXAMINATION:  No LMP recorded.  There is no height or weight on file to calculate BMI.  There were no vitals taken for this visit.  General Appearance: well appearing  Neuro: Alert and oriented   HEENT: mucous membranes moist, neck supple  Resp: No respiratory distress, normal work of breathing  MSK: normal range of motion, gait appropriate    Pelvic: deferred  PVR (by Ultrasound): 25 ml         IMPRESSION AND PLAN:  Jackie Malagon is a 64 y.o. who presents with OAB/UUI    OAB  - discussed etiology of OAB and potential management options  - reviewed bladder health recommendations (fluid intake, avoiding bladder triggers)  - discussed treatment options: PFPT, medications, PTNS, intradetrusor botox, SNM  - reviewed emerging data regarding anticholinergics and dementia risk; she reports she has noticed memory loss over the past few years  - in this light, recommend discontinuing oxybutynin as she has been on it for 10-12 years  - she  is amenable to switching to an alternative medication  - Rx sent to preferred pharmacy for myrbetriq 25 mg daily  - follow up in 1 month for medication check with Nida Shi NP      All questions and concerns were answered and addressed.  The patient expressed understanding and agrees with the plan.     RTC as above    10/7/2024     Maame Lopez MD

## 2024-10-08 ENCOUNTER — TELEPHONE (OUTPATIENT)
Dept: OBSTETRICS AND GYNECOLOGY | Facility: CLINIC | Age: 64
End: 2024-10-08
Payer: COMMERCIAL

## 2024-10-08 NOTE — TELEPHONE ENCOUNTER
[facility] sent fax stating that [id number]  needs a PA. PA initiated via Epic.     10/10/24 2460 PA approved.

## 2024-10-09 ENCOUNTER — APPOINTMENT (OUTPATIENT)
Dept: PHYSICAL THERAPY | Facility: CLINIC | Age: 64
End: 2024-10-09
Payer: COMMERCIAL

## 2024-10-12 ENCOUNTER — HOSPITAL ENCOUNTER (OUTPATIENT)
Dept: RADIOLOGY | Facility: CLINIC | Age: 64
Discharge: HOME | End: 2024-10-12
Payer: COMMERCIAL

## 2024-10-12 VITALS — HEIGHT: 65 IN | BODY MASS INDEX: 47.02 KG/M2 | WEIGHT: 282.19 LBS

## 2024-10-12 DIAGNOSIS — Z12.31 SCREENING MAMMOGRAM, ENCOUNTER FOR: ICD-10-CM

## 2024-10-12 PROCEDURE — 77067 SCR MAMMO BI INCL CAD: CPT

## 2024-10-12 PROCEDURE — 77067 SCR MAMMO BI INCL CAD: CPT | Performed by: RADIOLOGY

## 2024-10-12 PROCEDURE — 77063 BREAST TOMOSYNTHESIS BI: CPT | Performed by: RADIOLOGY

## 2024-10-15 DIAGNOSIS — N32.81 OAB (OVERACTIVE BLADDER): Primary | ICD-10-CM

## 2024-10-15 RX ORDER — MIRABEGRON 25 MG/1
25 TABLET, FILM COATED, EXTENDED RELEASE ORAL DAILY
Qty: 90 TABLET | Refills: 3 | Status: SHIPPED | OUTPATIENT
Start: 2024-10-15 | End: 2025-10-15

## 2024-10-16 ENCOUNTER — APPOINTMENT (OUTPATIENT)
Dept: PHARMACY | Facility: HOSPITAL | Age: 64
End: 2024-10-16
Payer: COMMERCIAL

## 2024-10-16 DIAGNOSIS — E66.01 CLASS 3 SEVERE OBESITY DUE TO EXCESS CALORIES WITH SERIOUS COMORBIDITY AND BODY MASS INDEX (BMI) OF 45.0 TO 49.9 IN ADULT: Primary | ICD-10-CM

## 2024-10-16 DIAGNOSIS — E66.813 CLASS 3 SEVERE OBESITY DUE TO EXCESS CALORIES WITH SERIOUS COMORBIDITY AND BODY MASS INDEX (BMI) OF 45.0 TO 49.9 IN ADULT: Primary | ICD-10-CM

## 2024-10-16 DIAGNOSIS — E11.65 TYPE 2 DIABETES MELLITUS WITH HYPERGLYCEMIA, WITHOUT LONG-TERM CURRENT USE OF INSULIN: ICD-10-CM

## 2024-10-16 PROCEDURE — RXMED WILLOW AMBULATORY MEDICATION CHARGE

## 2024-10-16 ASSESSMENT — ENCOUNTER SYMPTOMS: DIABETIC ASSOCIATED SYMPTOMS: 0

## 2024-10-16 NOTE — PROGRESS NOTES
[facility] Pharmacy Consult  Jackie Malagon is a 64 y.o. female was referred to Clinical Pharmacy Team for a Pharmacy consult.  The patient was referred for their Diabetes.    Referring Provider: Bryson Mejia DO    Subjective   Allergies   Allergen Reactions    Lactose Diarrhea       [facility] - [place], [place] - [address]  [address] [place] [address]  Phone: [phone] Fax: [phone]    [facility]  [address], [address]  [place] [id number]  Phone: [phone] Fax: [phone]      Diabetes  She presents for her follow-up diabetic visit. She has type 2 diabetes mellitus. Her disease course has been stable. There are no hypoglycemic associated symptoms. There are no diabetic associated symptoms. There are no hypoglycemic complications. Symptoms are stable. There are no diabetic complications. Risk factors for coronary artery disease include diabetes mellitus. Current diabetic treatment includes oral agent (monotherapy). She is compliant with treatment all of the time.     Nausea is no longer happening. Denies vomiting or stomach upset and pain. Appetite suppression come on and off. New weight 284 lbs    Baseline: 288 lbs    Review of Systems    Objective     There were no vitals taken for this visit.     LAB  Lab Results   Component Value Date    BILITOT 0.6 05/17/2024    CALCIUM 9.4 05/17/2024    CO2 25 05/17/2024     05/17/2024    CREATININE 0.97 05/17/2024    GLUCOSE 134 (H) 05/17/2024    ALKPHOS 80 05/17/2024    K 4.5 05/17/2024    PROT 7.2 05/17/2024     05/17/2024    AST 43 (H) 05/17/2024    ALT 36 05/17/2024    BUN 18 05/17/2024    ANIONGAP 16 05/17/2024    ALBUMIN 3.9 05/17/2024    GFRF 68 05/25/2022     Lab Results   Component Value Date    TRIG 201 (H) 02/24/2024    CHOL 161 02/24/2024    LDLCALC 70 02/24/2024    HDL 50.6 02/24/2024     Lab Results   Component Value Date    HGBA1C 7.3 (A) 08/26/2024       Current Outpatient  Medications on File Prior to Visit   Medication Sig Dispense Refill    cholecalciferol (Vitamin D-3) 25 MCG (1000 UT) capsule Take by mouth once daily.      ezetimibe (Zetia) 10 mg tablet Take 1 tablet (10 mg) by mouth once daily. 90 tablet 2     mg tablet Take 1 tablet (600 mg) by mouth every 6 hours if needed for pain.      losartan (Cozaar) 50 mg tablet Take 1 tablet (50 mg) by mouth once daily. 90 tablet 2    metFORMIN (Glucophage) 1,000 mg tablet Take 1 tablet (1,000 mg) by mouth 2 times daily (morning and late afternoon). 60 tablet 3    metoprolol succinate XL (Toprol-XL) 50 mg 24 hr tablet Take 1 tablet (50 mg) by mouth once daily. Do not crush or chew. Rx by Dr. Vinh Zapata      mirabegron (Mybetriq) 25 mg tablet extended release 24 hr 24 hr tablet Take 1 tablet (25 mg) by mouth once daily. 30 tablet 2    mirabegron (Mybetriq) 25 mg tablet extended release 24 hr 24 hr tablet Take 1 tablet (25 mg) by mouth once daily. 90 tablet 3    multivitamin tablet Take 1 tablet by mouth once daily.      oxybutynin XL (Ditropan XL) 10 mg 24 hr tablet Take 1 tablet (10 mg) by mouth once daily. Do not crush, chew, or split. 90 tablet 2    pravastatin (Pravachol) 40 mg tablet Take 1 tablet (40 mg) by mouth once daily at bedtime. 90 tablet 2    saccharomyces boulardii (Florastor) 250 mg capsule Take 1 capsule (250 mg) by mouth 2 times a day.      vits A,C,E/lutein/minerals (OCUVITE WITH LUTEIN ORAL) Take by mouth.      [DISCONTINUED] semaglutide (Rybelsus) 3 mg tablet Take 1 tablet (3 mg) by mouth once daily. 30 tablet 11     No current facility-administered medications on file prior to visit.        HISTORICAL PHARMACOTHERAPY  -none    DRUG INTERACTIONS  - none    SECONDARY PREVENTION  - Statin? yes  - ACE-I/ARB? yes    Assessment/Plan   Problem List Items Addressed This Visit       Class 3 severe obesity due to excess calories with serious comorbidity and body mass index (BMI) of 45.0 to 49.9 in adult - Primary     Type 2 diabetes mellitus with hyperglycemia, without long-term current use of insulin     INCREASE dose of Rybelsus to 7mg subcutaneous once weekly  Educate on side effects of dose increase  Reinforce patient that to maximize weight loss and prevent return weight gain, patient has to combine GLP-1 with good diet and exercise  DASH Diet  150 mins per week of exercise  FUV in 4 weeks regarding side effects and dose titration         Relevant Medications    semaglutide (Rybelsus) 7 mg tablet    Other Relevant Orders    Referral to Clinical Pharmacy        Continue all meds under the continuation of care with the referring provider and clinical pharmacy team.    Eduardo Walker PharmD     Verbal consent to manage patient's drug therapy was obtained from [the patient and/or an individual authorized to act on behalf of a patient]. They were informed they may decline to participate or withdraw from participation in pharmacy services at any time.

## 2024-10-16 NOTE — ASSESSMENT & PLAN NOTE
INCREASE dose of Rybelsus to 7mg subcutaneous once weekly  Educate on side effects of dose increase  Reinforce patient that to maximize weight loss and prevent return weight gain, patient has to combine GLP-1 with good diet and exercise  DASH Diet  150 mins per week of exercise  FUV in 4 weeks regarding side effects and dose titration

## 2024-10-18 ENCOUNTER — PHARMACY VISIT (OUTPATIENT)
Dept: PHARMACY | Facility: CLINIC | Age: 64
End: 2024-10-18
Payer: COMMERCIAL

## 2024-11-11 ENCOUNTER — APPOINTMENT (OUTPATIENT)
Dept: OBSTETRICS AND GYNECOLOGY | Facility: CLINIC | Age: 64
End: 2024-11-11
Payer: COMMERCIAL

## 2024-11-11 VITALS
BODY MASS INDEX: 46.98 KG/M2 | WEIGHT: 282 LBS | SYSTOLIC BLOOD PRESSURE: 114 MMHG | HEIGHT: 65 IN | DIASTOLIC BLOOD PRESSURE: 66 MMHG

## 2024-11-11 DIAGNOSIS — N32.81 OAB (OVERACTIVE BLADDER): ICD-10-CM

## 2024-11-11 DIAGNOSIS — R39.9 URINARY SYMPTOM OR SIGN: ICD-10-CM

## 2024-11-11 DIAGNOSIS — N39.46 MIXED STRESS AND URGE URINARY INCONTINENCE: ICD-10-CM

## 2024-11-11 DIAGNOSIS — N39.0 ACUTE UTI: ICD-10-CM

## 2024-11-11 LAB
POC APPEARANCE, URINE: ABNORMAL
POC BILIRUBIN, URINE: NEGATIVE
POC BLOOD, URINE: NEGATIVE
POC COLOR, URINE: YELLOW
POC GLUCOSE, URINE: NEGATIVE MG/DL
POC KETONES, URINE: ABNORMAL MG/DL
POC LEUKOCYTES, URINE: ABNORMAL
POC NITRITE,URINE: POSITIVE
POC PH, URINE: 5.5 PH
POC PROTEIN, URINE: NEGATIVE MG/DL
POC SPECIFIC GRAVITY, URINE: 1.02
POC UROBILINOGEN, URINE: 0.2 EU/DL

## 2024-11-11 PROCEDURE — 99214 OFFICE O/P EST MOD 30 MIN: CPT | Performed by: NURSE PRACTITIONER

## 2024-11-11 PROCEDURE — 81003 URINALYSIS AUTO W/O SCOPE: CPT

## 2024-11-11 PROCEDURE — 87086 URINE CULTURE/COLONY COUNT: CPT

## 2024-11-11 PROCEDURE — 4010F ACE/ARB THERAPY RXD/TAKEN: CPT | Performed by: NURSE PRACTITIONER

## 2024-11-11 PROCEDURE — 3046F HEMOGLOBIN A1C LEVEL >9.0%: CPT | Performed by: NURSE PRACTITIONER

## 2024-11-11 PROCEDURE — 1126F AMNT PAIN NOTED NONE PRSNT: CPT | Performed by: NURSE PRACTITIONER

## 2024-11-11 PROCEDURE — 3048F LDL-C <100 MG/DL: CPT | Performed by: NURSE PRACTITIONER

## 2024-11-11 PROCEDURE — 87186 SC STD MICRODIL/AGAR DIL: CPT

## 2024-11-11 PROCEDURE — 81003 URINALYSIS AUTO W/O SCOPE: CPT | Performed by: NURSE PRACTITIONER

## 2024-11-11 PROCEDURE — 3078F DIAST BP <80 MM HG: CPT | Performed by: NURSE PRACTITIONER

## 2024-11-11 PROCEDURE — 1159F MED LIST DOCD IN RCRD: CPT | Performed by: NURSE PRACTITIONER

## 2024-11-11 PROCEDURE — 1160F RVW MEDS BY RX/DR IN RCRD: CPT | Performed by: NURSE PRACTITIONER

## 2024-11-11 PROCEDURE — 3074F SYST BP LT 130 MM HG: CPT | Performed by: NURSE PRACTITIONER

## 2024-11-11 PROCEDURE — RXMED WILLOW AMBULATORY MEDICATION CHARGE

## 2024-11-11 PROCEDURE — 3008F BODY MASS INDEX DOCD: CPT | Performed by: NURSE PRACTITIONER

## 2024-11-11 RX ORDER — FLUCONAZOLE 150 MG/1
TABLET ORAL
Qty: 2 TABLET | Refills: 0 | Status: SHIPPED | OUTPATIENT
Start: 2024-11-11

## 2024-11-11 RX ORDER — MIRABEGRON 25 MG/1
25 TABLET, FILM COATED, EXTENDED RELEASE ORAL DAILY
Qty: 90 TABLET | Refills: 3 | Status: SHIPPED | OUTPATIENT
Start: 2024-11-11 | End: 2025-11-11

## 2024-11-11 RX ORDER — SULFAMETHOXAZOLE AND TRIMETHOPRIM 800; 160 MG/1; MG/1
1 TABLET ORAL 2 TIMES DAILY
Qty: 10 TABLET | Refills: 0 | Status: SHIPPED | OUTPATIENT
Start: 2024-11-11 | End: 2024-11-16

## 2024-11-11 ASSESSMENT — PAIN SCALES - GENERAL: PAINLEVEL_OUTOF10: 0-NO PAIN

## 2024-11-11 NOTE — PATIENT INSTRUCTIONS
"To reach the Urogynecology nurses for Dr. Lopez and Nida Shi, call [phone]. You will then select option 2 to be connected to the your provider's office and then option 3 for \"Paoli Urogyn or Elbert\". This will connect you with Verena or Florecita Abel.   "

## 2024-11-11 NOTE — PROGRESS NOTES
"HISTORY OF PRESENT ILLNESS:  Jackie Malagon is a 64 y.o. female, who presents in follow up for OAB/medication check on Myrbetriq 25 mg.      During last encounter on 10/7/24, reviewed and agreed to the following: C/o frequency, nocturia 2-3x nightly with urgency. Started Myrbetriq 25 mg. Previously on Oxybutynin.     Today she reports   - Patient says she immediately noticed an improvement once she took Myrbetriq, but sxs have worsened in the past few days.   - When she was doing well, she would void 3-4x daily. She had nocturia 2x per night and once she didn't have nocturia at all.   - She felt like she was emptying her bladder on Myrbetriq.   - The past few days, she has noticed worsening sxs. She has frequency and leakage. Denies dysuria or hematuria.   - Endorses 2 UTIs in the past year.           PHYSICAL EXAMINATION:  No LMP recorded. Patient has had a hysterectomy.  Body mass index is 46.93 kg/m².  /66   Ht 1.651 m (5' 5\")   Wt 128 kg (282 lb)   BMI 46.93 kg/m²     Physical Exam  Constitutional:       Appearance: Normal appearance. She is normal weight.   Pulmonary:      Effort: Pulmonary effort is normal.   Neurological:      Mental Status: She is alert.   Psychiatric:         Mood and Affect: Mood normal.         Behavior: Behavior normal.         Thought Content: Thought content normal.         Judgment: Judgment normal.     IMPRESSION AND PLAN:  Jackie Malagon is a 64 y.o. who is being treated for OAB.     Plan    1. OAB  - At last visit, she was started on Myrbetriq 25 mg.  - Given worsening incontinence and frequency x 3 days, UA was collected and was + nitrites and leukocytes.  - Sent rx for Bactrim DS; BID x5 days. Also sent rx for Diflucan to prevent a yeast infection after.   - Will culture urine to confirm sensitivities  - Continue Myrbetriq 25 mg po daily. Refills sent to preferred pharmacy.   - Will do PVR at next visit when infection is resolved     All questions and concerns were " answered and addressed.  The patient expressed understanding and agrees with the plan.     Follow-up in 4-6 weeks with KINZA Tobar.     Scribe Attestation:   I, Steffanie Patterson, am scribing for virtually, and in the presence of KINZA Tobar on 11/11/2024 at 3:37 PM.     I, Nida Shi, personally performed the services described in the documentation as scribed in my presence and confirm it is both complete and accurate.

## 2024-11-12 LAB
APPEARANCE UR: ABNORMAL
BILIRUB UR STRIP.AUTO-MCNC: NEGATIVE MG/DL
COLOR UR: YELLOW
GLUCOSE UR STRIP.AUTO-MCNC: NORMAL MG/DL
KETONES UR STRIP.AUTO-MCNC: NEGATIVE MG/DL
LEUKOCYTE ESTERASE UR QL STRIP.AUTO: NEGATIVE
NITRITE UR QL STRIP.AUTO: NEGATIVE
PH UR STRIP.AUTO: 5.5 [PH]
PROT UR STRIP.AUTO-MCNC: NEGATIVE MG/DL
RBC # UR STRIP.AUTO: NEGATIVE /UL
SP GR UR STRIP.AUTO: 1.02
UROBILINOGEN UR STRIP.AUTO-MCNC: NORMAL MG/DL

## 2024-11-13 ENCOUNTER — APPOINTMENT (OUTPATIENT)
Dept: PHARMACY | Facility: HOSPITAL | Age: 64
End: 2024-11-13
Payer: COMMERCIAL

## 2024-11-13 DIAGNOSIS — E66.813 CLASS 3 SEVERE OBESITY DUE TO EXCESS CALORIES WITH SERIOUS COMORBIDITY AND BODY MASS INDEX (BMI) OF 45.0 TO 49.9 IN ADULT: ICD-10-CM

## 2024-11-13 DIAGNOSIS — E11.9 TYPE 2 DIABETES MELLITUS WITHOUT COMPLICATION, WITHOUT LONG-TERM CURRENT USE OF INSULIN (MULTI): ICD-10-CM

## 2024-11-13 DIAGNOSIS — E11.65 TYPE 2 DIABETES MELLITUS WITH HYPERGLYCEMIA, WITHOUT LONG-TERM CURRENT USE OF INSULIN: Primary | ICD-10-CM

## 2024-11-13 DIAGNOSIS — E66.01 CLASS 3 SEVERE OBESITY DUE TO EXCESS CALORIES WITH SERIOUS COMORBIDITY AND BODY MASS INDEX (BMI) OF 45.0 TO 49.9 IN ADULT: ICD-10-CM

## 2024-11-13 RX ORDER — LANCETS
EACH MISCELLANEOUS
Qty: 100 EACH | Refills: 11 | Status: SHIPPED | OUTPATIENT
Start: 2024-11-13

## 2024-11-13 RX ORDER — IBUPROFEN 200 MG
CAPSULE ORAL
Qty: 100 EACH | Refills: 11 | Status: SHIPPED | OUTPATIENT
Start: 2024-11-13

## 2024-11-13 RX ORDER — DEXTROSE 4 G
TABLET,CHEWABLE ORAL
Qty: 1 EACH | Refills: 0 | Status: SHIPPED | OUTPATIENT
Start: 2024-11-13

## 2024-11-13 ASSESSMENT — ENCOUNTER SYMPTOMS: DIABETIC ASSOCIATED SYMPTOMS: 0

## 2024-11-13 NOTE — ASSESSMENT & PLAN NOTE
CONTINUE all meds as prescribed  Educate on hypo- and hyperglycemia  Educate on signs for DKA in acute sickness  FUV in 4 weeks. Need new a1c

## 2024-11-13 NOTE — PROGRESS NOTES
[facility] Pharmacy Consult  Jackie Malagon is a 64 y.o. female was referred to Clinical Pharmacy Team for a Pharmacy consult.  The patient was referred for their Diabetes.    Referring Provider: Bryson Mejia DO    Subjective   Allergies   Allergen Reactions    Lactose Diarrhea       [id number] - [place], [place] - [address]  [address] [place] [address]  Phone: [phone] Fax: [phone]    [facility]ly [facility]  [address], [address]  [place] [id number]  Phone: [phone] Fax: [phone]      Diabetes  She presents for her follow-up diabetic visit. She has type 2 diabetes mellitus. Her disease course has been stable. There are no hypoglycemic associated symptoms. There are no diabetic associated symptoms. There are no hypoglycemic complications. Symptoms are stable. There are no diabetic complications. Risk factors for coronary artery disease include diabetes mellitus. She is compliant with treatment all of the time.     Reported gained 7 lbs. Reported some constipation. Denies any nausea and vomiting or stomach pain. Reported she is feeling sick and caught UTI. Denies fever.     Review of Systems    Objective     There were no vitals taken for this visit.     LAB  Lab Results   Component Value Date    BILITOT 0.6 05/17/2024    CALCIUM 9.4 05/17/2024    CO2 25 05/17/2024     05/17/2024    CREATININE 0.97 05/17/2024    GLUCOSE 134 (H) 05/17/2024    ALKPHOS 80 05/17/2024    K 4.5 05/17/2024    PROT 7.2 05/17/2024     05/17/2024    AST 43 (H) 05/17/2024    ALT 36 05/17/2024    BUN 18 05/17/2024    ANIONGAP 16 05/17/2024    ALBUMIN 3.9 05/17/2024    GFRF 68 05/25/2022     Lab Results   Component Value Date    TRIG 201 (H) 02/24/2024    CHOL 161 02/24/2024    LDLCALC 70 02/24/2024    HDL 50.6 02/24/2024     Lab Results   Component Value Date    HGBA1C 7.3 (A) 08/26/2024       Current Outpatient Medications on File Prior to Visit   Medication Sig Dispense  Refill    cholecalciferol (Vitamin D-3) 25 MCG (1000 UT) capsule Take by mouth once daily.      ezetimibe (Zetia) 10 mg tablet Take 1 tablet (10 mg) by mouth once daily. 90 tablet 2    fluconazole (Diflucan) 150 mg tablet Take one dose today and repeat in 3 days 2 tablet 0     mg tablet Take 1 tablet (600 mg) by mouth every 6 hours if needed for pain.      losartan (Cozaar) 50 mg tablet Take 1 tablet (50 mg) by mouth once daily. 90 tablet 2    metoprolol succinate XL (Toprol-XL) 50 mg 24 hr tablet Take 1 tablet (50 mg) by mouth once daily. Do not crush or chew. Rx by Dr. Vinh Zapata      mirabegron (Mybetriq) 25 mg tablet extended release 24 hr 24 hr tablet Take 1 tablet (25 mg) by mouth once daily. 30 tablet 2    mirabegron (Mybetriq) 25 mg tablet extended release 24 hr 24 hr tablet Take 1 tablet (25 mg) by mouth once daily. 90 tablet 3    multivitamin tablet Take 1 tablet by mouth once daily.      pravastatin (Pravachol) 40 mg tablet Take 1 tablet (40 mg) by mouth once daily at bedtime. 90 tablet 2    saccharomyces boulardii (Florastor) 250 mg capsule Take 1 capsule (250 mg) by mouth 2 times a day.      semaglutide (Rybelsus) 7 mg tablet Take 1 tablet (7 mg) by mouth once daily. 30 tablet 3    sulfamethoxazole-trimethoprim (Bactrim DS) 800-160 mg tablet Take 1 tablet by mouth 2 times a day for 5 days. 10 tablet 0    vits A,C,E/lutein/minerals (OCUVITE WITH LUTEIN ORAL) Take by mouth.      [DISCONTINUED] metFORMIN (Glucophage) 1,000 mg tablet Take 1 tablet (1,000 mg) by mouth 2 times daily (morning and late afternoon). 60 tablet 3    [DISCONTINUED] mirabegron (Mybetriq) 25 mg tablet extended release 24 hr 24 hr tablet Take 1 tablet (25 mg) by mouth once daily. 90 tablet 3    [DISCONTINUED] oxybutynin XL (Ditropan XL) 10 mg 24 hr tablet Take 1 tablet (10 mg) by mouth once daily. Do not crush, chew, or split. 90 tablet 2     No current facility-administered medications on file prior to visit.        HISTORICAL  PHARMACOTHERAPY  -nothing    DRUG INTERACTIONS  - nothing    SECONDARY PREVENTION  - Statin? yes  - ACE-I/ARB? yes    Assessment/Plan   Problem List Items Addressed This Visit       Class 3 severe obesity due to excess calories with serious comorbidity and body mass index (BMI) of 45.0 to 49.9 in adult    Type 2 diabetes mellitus with hyperglycemia, without long-term current use of insulin - Primary     CONTINUE all meds as prescribed  Educate on hypo- and hyperglycemia  Educate on signs for DKA in acute sickness  FUV in 4 weeks          Other Visit Diagnoses       Type 2 diabetes mellitus without complication, without long-term current use of insulin (Multi)                 Continue all meds under the continuation of care with the referring provider and clinical pharmacy team.    Eduardo Walker PharmD     Verbal consent to manage patient's drug therapy was obtained from [the patient and/or an individual authorized to act on behalf of a patient]. They were informed they may decline to participate or withdraw from participation in pharmacy services at any time.

## 2024-11-14 ENCOUNTER — PHARMACY VISIT (OUTPATIENT)
Dept: PHARMACY | Facility: CLINIC | Age: 64
End: 2024-11-14
Payer: COMMERCIAL

## 2024-11-14 ENCOUNTER — TELEPHONE (OUTPATIENT)
Dept: OBSTETRICS AND GYNECOLOGY | Facility: CLINIC | Age: 64
End: 2024-11-14
Payer: COMMERCIAL

## 2024-11-14 LAB — BACTERIA UR CULT: ABNORMAL

## 2024-11-19 ENCOUNTER — APPOINTMENT (OUTPATIENT)
Dept: GYNECOLOGIC ONCOLOGY | Facility: CLINIC | Age: 64
End: 2024-11-19
Payer: COMMERCIAL

## 2024-11-22 NOTE — PROGRESS NOTES
Patient ID: Jackie Malagon is a 64 y.o. female.  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 She states she is overall doing well since her procedure but continues to have intermittent LE swelling with negative  BLE Dopplers for DVT.      Treatment History:  22: aborted diagnostic laparoscopy, lysis of adhesion - transvaginal hysterectomy with bilateral salpingo-oophorectomy (incomplete surgical staging)      Recent Pathology:  22: D&C   A.  Endocervix, curettage:                                           -- Very scant endocervical epithelium with no significant pathologic findings.          -- Scant superficial strips of inactive endometrial epithelium.                                              -- Specimen consists predominantly of blood.                                                                    B.  Endometrium and polyps, curettage:                                                 -- Endometrial adenocarcinoma, endometrioid type with squamous differentiation, FIGO grade 1, in the background of atypical endometrial hyperplasia.  See note.  -- Fragments of benign smooth muscle.  MMR Protein Expression  Protein:         Result:            MLH-1           Positive                                                PMS-2           Positive                                                  MSH-2           Positive                                                MSH-6           Positive  ==================================  Medical History:  - BMI 51  - Hypertension  - HLD   - GERD  - Stress urinary incontinence      Surgical History:  - C/section x2 (via midline incision)  - Cholecystectomy      Obstetric/Gynecologic History:   - Menarche age 12  - Menopausal since   - History of OCP or HRT use: OCPs x1 year, denies  HRT  - Last Pap: 2/2022     Family History:   - Mother: melanoma  - Mat. uncle: lung cancer  - MGM: breast cancer      Social History: works as , identifies as [employer]. 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  Here for 6 month follow up.  Patient had a UTI two weeks ago, took Bactrim.  She is now seeing Dr. Lopez.  Her nocturia has improved.   She has been taking semaglutide, has lost some weight, does get nauseous in the morning.  Patient is not currently sexually active. Patient mammogram is up to date.  Colonoscopy done, her diarrhea has improved since.      Physical Exam:    Constitutional: Doing well. NAKIA  Eyes: PERRL  ENMT: Moist mucus membranes  Head/Neck: Supple. Symmetrical  Cardiovascular: Regular, rate and rhythm. 2+ equal pulses of the extremities  Respiratory/Thorax: CTA. RRR. Chest rise symmetrical.  Gastrointestinal: Non-distended, soft, non-tender  Genitourinary:   Normal external female genitalia. No vulvar lesions noted  Speculum exam: Smooth vaginal walls without lesions or masses. Vaginal cuff visualized without lesions. Atrophic changes along introitus and vaginal walls.    Bimanual exam: Smooth vaginal wall without lesions or masses.  Surgically absent uterus, cervix, and adnexa.    Rectovaginal exam: smooth rectovaginal septum without lesions or masses  Musculoskeletal: ROM intact, no joint swelling,  normal strength  Extremities: No edema  Neurological: Alert and oriented x 3. Pleasant and cooperative.  Lymphatic: No lymphadenopathy. No lymphedema  Psychological: Appropriate mood and behavior  Skin: Warm and dry, no lesions, no rashes    A complete review of systems was performed and all systems were normal except what is noted in the interval history.        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 NAKIA 2.5 years.        PLAN:  F/U in 6 months or as needed  RX for Estrace sent, intravaginally three nights a week, using dime to nickel size amount on finger and applying at specified area.  Do not use applicator.  Patient denies any vaginal bleeding, pink tinged discharge. Patient denies any constipation or diarrhea.  Appetite has been good.  Energy levels are baseline.  Patient denies hematuria.  Patient denies urinary leakage or incontinence.

## 2024-11-25 ASSESSMENT — PROMIS GLOBAL HEALTH SCALE
RATE_QUALITY_OF_LIFE: GOOD
CARRYOUT_PHYSICAL_ACTIVITIES: COMPLETELY
RATE_GENERAL_HEALTH: GOOD
RATE_SOCIAL_SATISFACTION: GOOD
EMOTIONAL_PROBLEMS: SOMETIMES
RATE_MENTAL_HEALTH: VERY GOOD
RATE_PHYSICAL_HEALTH: GOOD
RATE_AVERAGE_FATIGUE: MODERATE
CARRYOUT_SOCIAL_ACTIVITIES: GOOD
RATE_AVERAGE_PAIN: 0

## 2024-11-26 ENCOUNTER — OFFICE VISIT (OUTPATIENT)
Dept: GYNECOLOGIC ONCOLOGY | Facility: CLINIC | Age: 64
End: 2024-11-26
Payer: COMMERCIAL

## 2024-11-26 ENCOUNTER — APPOINTMENT (OUTPATIENT)
Dept: PRIMARY CARE | Facility: CLINIC | Age: 64
End: 2024-11-26
Payer: COMMERCIAL

## 2024-11-26 VITALS
RESPIRATION RATE: 16 BRPM | SYSTOLIC BLOOD PRESSURE: 144 MMHG | HEART RATE: 88 BPM | DIASTOLIC BLOOD PRESSURE: 85 MMHG | BODY MASS INDEX: 46.89 KG/M2 | WEIGHT: 281.75 LBS | TEMPERATURE: 97.2 F | OXYGEN SATURATION: 94 %

## 2024-11-26 VITALS
WEIGHT: 280 LBS | OXYGEN SATURATION: 99 % | DIASTOLIC BLOOD PRESSURE: 82 MMHG | BODY MASS INDEX: 46.65 KG/M2 | HEIGHT: 65 IN | SYSTOLIC BLOOD PRESSURE: 128 MMHG | HEART RATE: 86 BPM

## 2024-11-26 DIAGNOSIS — E11.65 TYPE 2 DIABETES MELLITUS WITH HYPERGLYCEMIA, WITHOUT LONG-TERM CURRENT USE OF INSULIN: ICD-10-CM

## 2024-11-26 DIAGNOSIS — Z00.00 HEALTHCARE MAINTENANCE: Primary | ICD-10-CM

## 2024-11-26 DIAGNOSIS — E66.01 CLASS 3 SEVERE OBESITY DUE TO EXCESS CALORIES WITH SERIOUS COMORBIDITY AND BODY MASS INDEX (BMI) OF 45.0 TO 49.9 IN ADULT: ICD-10-CM

## 2024-11-26 DIAGNOSIS — E55.9 VITAMIN D DEFICIENCY: ICD-10-CM

## 2024-11-26 DIAGNOSIS — I10 HYPERTENSION, UNSPECIFIED TYPE: ICD-10-CM

## 2024-11-26 DIAGNOSIS — E78.5 HYPERLIPIDEMIA, UNSPECIFIED HYPERLIPIDEMIA TYPE: ICD-10-CM

## 2024-11-26 DIAGNOSIS — N95.2 VAGINAL ATROPHY: Primary | ICD-10-CM

## 2024-11-26 DIAGNOSIS — E66.813 CLASS 3 SEVERE OBESITY DUE TO EXCESS CALORIES WITH SERIOUS COMORBIDITY AND BODY MASS INDEX (BMI) OF 45.0 TO 49.9 IN ADULT: ICD-10-CM

## 2024-11-26 DIAGNOSIS — R30.0 DYSURIA: ICD-10-CM

## 2024-11-26 DIAGNOSIS — E11.9 TYPE 2 DIABETES MELLITUS WITHOUT COMPLICATION, WITHOUT LONG-TERM CURRENT USE OF INSULIN (MULTI): ICD-10-CM

## 2024-11-26 LAB — POC HEMOGLOBIN A1C: 6.8 % (ref 4.2–6.5)

## 2024-11-26 PROCEDURE — 99214 OFFICE O/P EST MOD 30 MIN: CPT | Performed by: NURSE PRACTITIONER

## 2024-11-26 PROCEDURE — 83036 HEMOGLOBIN GLYCOSYLATED A1C: CPT | Performed by: FAMILY MEDICINE

## 2024-11-26 PROCEDURE — 1160F RVW MEDS BY RX/DR IN RCRD: CPT | Performed by: FAMILY MEDICINE

## 2024-11-26 PROCEDURE — 4010F ACE/ARB THERAPY RXD/TAKEN: CPT | Performed by: NURSE PRACTITIONER

## 2024-11-26 PROCEDURE — 3048F LDL-C <100 MG/DL: CPT | Performed by: NURSE PRACTITIONER

## 2024-11-26 PROCEDURE — 3079F DIAST BP 80-89 MM HG: CPT | Performed by: NURSE PRACTITIONER

## 2024-11-26 PROCEDURE — 3048F LDL-C <100 MG/DL: CPT | Performed by: FAMILY MEDICINE

## 2024-11-26 PROCEDURE — 99215 OFFICE O/P EST HI 40 MIN: CPT | Performed by: FAMILY MEDICINE

## 2024-11-26 PROCEDURE — 1036F TOBACCO NON-USER: CPT | Performed by: NURSE PRACTITIONER

## 2024-11-26 PROCEDURE — 3074F SYST BP LT 130 MM HG: CPT | Performed by: FAMILY MEDICINE

## 2024-11-26 PROCEDURE — 3077F SYST BP >= 140 MM HG: CPT | Performed by: NURSE PRACTITIONER

## 2024-11-26 PROCEDURE — 3079F DIAST BP 80-89 MM HG: CPT | Performed by: FAMILY MEDICINE

## 2024-11-26 PROCEDURE — 4010F ACE/ARB THERAPY RXD/TAKEN: CPT | Performed by: FAMILY MEDICINE

## 2024-11-26 PROCEDURE — 1036F TOBACCO NON-USER: CPT | Performed by: FAMILY MEDICINE

## 2024-11-26 PROCEDURE — 1159F MED LIST DOCD IN RCRD: CPT | Performed by: NURSE PRACTITIONER

## 2024-11-26 PROCEDURE — 3046F HEMOGLOBIN A1C LEVEL >9.0%: CPT | Performed by: NURSE PRACTITIONER

## 2024-11-26 PROCEDURE — 3008F BODY MASS INDEX DOCD: CPT | Performed by: FAMILY MEDICINE

## 2024-11-26 PROCEDURE — 1126F AMNT PAIN NOTED NONE PRSNT: CPT | Performed by: NURSE PRACTITIONER

## 2024-11-26 PROCEDURE — 3046F HEMOGLOBIN A1C LEVEL >9.0%: CPT | Performed by: FAMILY MEDICINE

## 2024-11-26 PROCEDURE — 1159F MED LIST DOCD IN RCRD: CPT | Performed by: FAMILY MEDICINE

## 2024-11-26 RX ORDER — METOPROLOL SUCCINATE 50 MG/1
50 TABLET, EXTENDED RELEASE ORAL DAILY
Qty: 90 TABLET | Refills: 2 | Status: SHIPPED | OUTPATIENT
Start: 2024-11-26 | End: 2025-08-23

## 2024-11-26 RX ORDER — EZETIMIBE 10 MG/1
10 TABLET ORAL DAILY
Qty: 90 TABLET | Refills: 2 | Status: SHIPPED | OUTPATIENT
Start: 2024-11-26

## 2024-11-26 RX ORDER — ESTRADIOL 0.1 MG/G
CREAM VAGINAL
Qty: 42.5 G | Refills: 3 | Status: SHIPPED | OUTPATIENT
Start: 2024-11-26

## 2024-11-26 RX ORDER — PRAVASTATIN SODIUM 40 MG/1
40 TABLET ORAL NIGHTLY
Qty: 90 TABLET | Refills: 2 | Status: SHIPPED | OUTPATIENT
Start: 2024-11-26

## 2024-11-26 RX ORDER — LOSARTAN POTASSIUM 50 MG/1
50 TABLET ORAL DAILY
Qty: 90 TABLET | Refills: 2 | Status: SHIPPED | OUTPATIENT
Start: 2024-11-26

## 2024-11-26 RX ORDER — LANCETS
EACH MISCELLANEOUS
Qty: 100 EACH | Refills: 11 | Status: SHIPPED | OUTPATIENT
Start: 2024-11-26

## 2024-11-26 RX ORDER — IBUPROFEN 200 MG
CAPSULE ORAL
Qty: 100 EACH | Refills: 11 | Status: SHIPPED | OUTPATIENT
Start: 2024-11-26

## 2024-11-26 ASSESSMENT — PAIN SCALES - GENERAL: PAINLEVEL_OUTOF10: 0-NO PAIN

## 2024-11-26 NOTE — PROGRESS NOTES
Annual Comprehensive Medical Exam    64 y.o. female presents for annual comprehensive medical evaluation and preventive services screening.  No recent hospitalizations, surgeries or significant injuries.    History of Present Illness  Patient sees cardiologist, urology nurse practitioner and OB/GYN nurse practitioner.    Colonoscopy in 2024.  Repeat in   Mammogram in 2024.    Morbid obesity.  BMI 46.59.  has lost 28 lb since 2024.  Stopped eating bread and pasta.  Eats lots of soup, salad, fruits and vegetable.  Concerned about protein intake    Diabetes type 2.  Hemoglobin A1c today 6.8% (improved).  Patient is taking Rybelsus 7 mg daily.  No diarrhea since stopping Metformin and having colonoscopy.  Metformin stopped when started Rybelsus.  Rybelsus still contributes to nausea.    - involved with Clinical Pharmacy. Working on medication adjustments.    UTI 2 wks ago.  asymptomatic    Hypertension, hyperlipidemia managed with medication indicated below.    Daytime fatigue witnessed gasping episodes, snoring.  Had polysomnogram years ago.  Is interested in retesting for sleep apnea.  She has significant daytime fatigue.    Past Medical History:   Diagnosis Date    Arthritis 2022    Cancer (Multi) May 2022    Diabetes mellitus (Multi) 2022    GERD (gastroesophageal reflux disease) 2021    Hypertension Unknown 2014    Urinary incontinence     Urinary tract infection     Uterine cancer (Multi) May 2022      Past Surgical History:   Procedure Laterality Date     SECTION, LOW TRANSVERSE  3-12-83 & 1987    CHOLECYSTECTOMY  2013    HYSTERECTOMY  2022    TUBAL LIGATION  1987     Family History   Problem Relation Name Age of Onset    Cancer Father Bryson Carrillo     Cancer Maternal Grandmother Florecita Bennett     Cancer Mother's Brother Lefty Bennett     Cancer Mother's Brother Twin Bennett       Social History     Socioeconomic History    Marital  status:      Spouse name: Not on file    Number of children: Not on file    Years of education: Not on file    Highest education level: Not on file   Occupational History    Not on file   Tobacco Use    Smoking status: Former     Current packs/day: 0.00     Average packs/day: 0.5 packs/day for 10.0 years (5.0 ttl pk-yrs)     Types: Cigarettes     Start date: 1977     Quit date: 3/10/1987     Years since quittin.7    Smokeless tobacco: Never    Tobacco comments:     Quit 1987.  The day I found out i was pregnant with my son   Vaping Use    Vaping status: Never Used   Substance and Sexual Activity    Alcohol use: Never    Drug use: Never    Sexual activity: Not Currently     Partners: Male     Birth control/protection: Abstinence   Other Topics Concern    Not on file   Social History Narrative    Not on file     Social Drivers of Health     Financial Resource Strain: Not on file   Food Insecurity: Not on file   Transportation Needs: Not on file   Physical Activity: Not on file   Stress: Not on file   Social Connections: Not on file   Intimate Partner Violence: Not on file   Housing Stability: Not on file       Current Outpatient Medications on File Prior to Visit   Medication Sig Dispense Refill    blood sugar diagnostic (Blood Glucose Test) strip Use as directed to test blood sugar once daily. 100 each 11    blood-glucose meter misc Use as directed to test blood sugar once daily. 1 each 0    cholecalciferol (Vitamin D-3) 25 MCG (1000 UT) capsule Take by mouth once daily.      estradiol (Estrace) 0.01 % (0.1 mg/gram) vaginal cream Dime to nickel size amount on your finger and apply to directed area.  3 nights a week at bedtime 42.5 g 3    ezetimibe (Zetia) 10 mg tablet Take 1 tablet (10 mg) by mouth once daily. 90 tablet 2    fluconazole (Diflucan) 150 mg tablet Take one dose today and repeat in 3 days 2 tablet 0     mg tablet Take 1 tablet (600 mg) by mouth every 6 hours if needed for  "pain.      lancets misc Use as directed to test blood sugar once daily. 100 each 11    losartan (Cozaar) 50 mg tablet Take 1 tablet (50 mg) by mouth once daily. 90 tablet 2    metoprolol succinate XL (Toprol-XL) 50 mg 24 hr tablet Take 1 tablet (50 mg) by mouth once daily. Do not crush or chew. Rx by Dr. Vinh Zapata      mirabegron (Mybetriq) 25 mg tablet extended release 24 hr 24 hr tablet Take 1 tablet (25 mg) by mouth once daily. 30 tablet 2    mirabegron (Mybetriq) 25 mg tablet extended release 24 hr 24 hr tablet Take 1 tablet (25 mg) by mouth once daily. 90 tablet 3    multivitamin tablet Take 1 tablet by mouth once daily.      pravastatin (Pravachol) 40 mg tablet Take 1 tablet (40 mg) by mouth once daily at bedtime. 90 tablet 2    saccharomyces boulardii (Florastor) 250 mg capsule Take 1 capsule (250 mg) by mouth 2 times a day.      semaglutide (Rybelsus) 7 mg tablet Take 1 tablet (7 mg) by mouth once daily. 30 tablet 3    vits A,C,E/lutein/minerals (OCUVITE WITH LUTEIN ORAL) Take by mouth.       No current facility-administered medications on file prior to visit.       Allergies   Allergen Reactions    Lactose Diarrhea       Complete review of systems is negative today except for that mentioned in the history of present illness.  In particular patient denies chest pain, shortness of breath, headaches and GI disturbances.      Visit Vitals  /82   Pulse 86   Ht 1.651 m (5' 5\")   Wt 127 kg (280 lb)   SpO2 99%   BMI 46.59 kg/m²   OB Status Hysterectomy   Smoking Status Former   BSA 2.41 m²      Vitals:    11/26/24 1439   BP: 128/82   Pulse: 86   SpO2: 99%   Weight: 127 kg (280 lb)   Height: 1.651 m (5' 5\")     Physical Exam  Gen.: Alert and oriented ×3 female in no acute distress.  HEENT: Head is normocephalic.  Pupils equal and reactive to light.  Tympanic membranes are clear.  Pharynx is clear.  Neck is supple without adenopathy or carotid bruits.  No masses or thyromegaly  Heart: Regular rate and rhythm " without murmurs.  Lungs: Clear to auscultation bilaterally.  Breasts: deferred to GYN at pt request.  Pelvic: Deferred to GYN at pt request.  Abdomen: Soft with normal bowel sounds.  No masses or pain to palpation.  No bruits auscultated.  Extremities: Good range of motion of all joints.  No significant edema. Pedal pulses +1-2/4  Skin: No significant or irregular nevi visualized.  Neuro: No signs of focal neurologic deficit.  No tremor.  Speech and hearing are normal.  DTRs +3/4;  Muscle Strength +5/5.  Musculoskeletal: Spine with good ROM.  No scoliosis.  Leg lengths are equal.  Psych: normal affect.  No suicidal ideation.  Good judgement and insight.       Diagnosis/Plan    1. Healthcare maintenance (Primary)    2. Hypertension, unspecified type  - losartan (Cozaar) 50 mg tablet; Take 1 tablet (50 mg) by mouth once daily.  Dispense: 90 tablet; Refill: 2  - metoprolol succinate XL (Toprol-XL) 50 mg 24 hr tablet; Take 1 tablet (50 mg) by mouth once daily. Do not crush or chew. Rx by Dr. Vinh Zapata  Dispense: 90 tablet; Refill: 2    3. Type 2 diabetes mellitus with hyperglycemia, without long-term current use of insulin  -Continue working with clinical pharmacist, Dr. LASHON Walker  - blood sugar diagnostic (Blood Glucose Test) strip; Use as directed to test blood sugar once daily.  Dispense: 100 each; Refill: 11  - lancets misc; Use as directed to test blood sugar once daily.  Dispense: 100 each; Refill: 11    4. Hyperlipidemia, unspecified hyperlipidemia type  - Comprehensive Metabolic Panel; Future  - Lipid Panel; Future  - TSH with reflex to Free T4 if abnormal; Future  - ezetimibe (Zetia) 10 mg tablet; Take 1 tablet (10 mg) by mouth once daily.  Dispense: 90 tablet; Refill: 2  - pravastatin (Pravachol) 40 mg tablet; Take 1 tablet (40 mg) by mouth once daily at bedtime.  Dispense: 90 tablet; Refill: 2    5. Class 3 severe obesity due to excess calories with serious comorbidity and body mass index (BMI) of 45.0 to  49.9 in adult  - blood sugar diagnostic (Blood Glucose Test) strip; Use as directed to test blood sugar once daily.  Dispense: 100 each; Refill: 11  - lancets misc; Use as directed to test blood sugar once daily.  Dispense: 100 each; Refill: 11    6. Vitamin D deficiency  - CBC; Future  - Vitamin D 25-Hydroxy,Total (for eval of Vitamin D levels); Future    7. Dysuria  - Urinalysis with Reflex Microscopic; Future    8. Type 2 diabetes mellitus without complication, without long-term current use of insulin (Multi)  - POCT glycosylated hemoglobin (Hb A1C) manually resulted  - blood sugar diagnostic (Blood Glucose Test) strip; Use as directed to test blood sugar once daily.  Dispense: 100 each; Refill: 11  - lancets misc; Use as directed to test blood sugar once daily.  Dispense: 100 each; Refill: 11  - semaglutide (Rybelsus) 14 mg tablet tablet; Take 1 tablet (14 mg) by mouth once daily.  Dispense: 90 tablet; Refill: 2    -Will increase Rybelsus to 14 mg tablet.  She can take 2 of the 7 mg tablets daily until the supply is exhausted.    9.  Daytime fatigue.  Obstructive sleep apnea suspect  In-home 3 night sleep study ordered          Follow up in 3 months for medical management and welcome to Medicare annual wellness visit    I will continue to monitor, evaluate, assess and treat all problems/diagnoses as appropriate and continue to collaborate with specialists.    Contact office or send a  MY Chart message with any questions or concerns    Encouraged to sign up with my  My Chart  Patient will only be notified of labs that require medical intervention.    Prescriptions will not be filled unless you are compliant with your follow up appointments or have a follow up appointment scheduled as per instruction of your physician. Refills should be requested at the time of your visit.    **Charting was completed using voice recognition technology and may include unintended errors**    Bryson Mejia, DO, [facility]  [id number]  [place] [address]  [phone]          Bryson Mejia DO, FACOFP

## 2024-11-30 ENCOUNTER — LAB (OUTPATIENT)
Dept: LAB | Facility: LAB | Age: 64
End: 2024-11-30
Payer: COMMERCIAL

## 2024-11-30 DIAGNOSIS — E78.5 HYPERLIPIDEMIA, UNSPECIFIED HYPERLIPIDEMIA TYPE: ICD-10-CM

## 2024-11-30 DIAGNOSIS — E55.9 VITAMIN D DEFICIENCY: ICD-10-CM

## 2024-11-30 LAB
25(OH)D3 SERPL-MCNC: 61 NG/ML (ref 30–100)
ALBUMIN SERPL BCP-MCNC: 4 G/DL (ref 3.4–5)
ALP SERPL-CCNC: 88 U/L (ref 33–136)
ALT SERPL W P-5'-P-CCNC: 37 U/L (ref 7–45)
ANION GAP SERPL CALC-SCNC: 13 MMOL/L (ref 10–20)
AST SERPL W P-5'-P-CCNC: 47 U/L (ref 9–39)
BILIRUB SERPL-MCNC: 0.5 MG/DL (ref 0–1.2)
BUN SERPL-MCNC: 15 MG/DL (ref 6–23)
CALCIUM SERPL-MCNC: 9.5 MG/DL (ref 8.6–10.6)
CHLORIDE SERPL-SCNC: 103 MMOL/L (ref 98–107)
CHOLEST SERPL-MCNC: 155 MG/DL (ref 0–199)
CHOLESTEROL/HDL RATIO: 3
CO2 SERPL-SCNC: 29 MMOL/L (ref 21–32)
CREAT SERPL-MCNC: 1.06 MG/DL (ref 0.5–1.05)
EGFRCR SERPLBLD CKD-EPI 2021: 58 ML/MIN/1.73M*2
ERYTHROCYTE [DISTWIDTH] IN BLOOD BY AUTOMATED COUNT: 13 % (ref 11.5–14.5)
GLUCOSE SERPL-MCNC: 147 MG/DL (ref 74–99)
HCT VFR BLD AUTO: 44.3 % (ref 36–46)
HDLC SERPL-MCNC: 51.1 MG/DL
HGB BLD-MCNC: 14.3 G/DL (ref 12–16)
LDLC SERPL CALC-MCNC: 71 MG/DL
MCH RBC QN AUTO: 29.5 PG (ref 26–34)
MCHC RBC AUTO-ENTMCNC: 32.3 G/DL (ref 32–36)
MCV RBC AUTO: 91 FL (ref 80–100)
NON HDL CHOLESTEROL: 104 MG/DL (ref 0–149)
NRBC BLD-RTO: 0 /100 WBCS (ref 0–0)
PLATELET # BLD AUTO: 280 X10*3/UL (ref 150–450)
POTASSIUM SERPL-SCNC: 4.3 MMOL/L (ref 3.5–5.3)
PROT SERPL-MCNC: 7.7 G/DL (ref 6.4–8.2)
RBC # BLD AUTO: 4.85 X10*6/UL (ref 4–5.2)
SODIUM SERPL-SCNC: 141 MMOL/L (ref 136–145)
T4 FREE SERPL-MCNC: 1.24 NG/DL (ref 0.78–1.48)
TRIGL SERPL-MCNC: 167 MG/DL (ref 0–149)
TSH SERPL-ACNC: 4.86 MIU/L (ref 0.44–3.98)
VLDL: 33 MG/DL (ref 0–40)
WBC # BLD AUTO: 9.7 X10*3/UL (ref 4.4–11.3)

## 2024-11-30 PROCEDURE — 84443 ASSAY THYROID STIM HORMONE: CPT

## 2024-11-30 PROCEDURE — 36415 COLL VENOUS BLD VENIPUNCTURE: CPT

## 2024-11-30 PROCEDURE — 80061 LIPID PANEL: CPT

## 2024-11-30 PROCEDURE — 84439 ASSAY OF FREE THYROXINE: CPT

## 2024-11-30 PROCEDURE — 80053 COMPREHEN METABOLIC PANEL: CPT

## 2024-11-30 PROCEDURE — 82306 VITAMIN D 25 HYDROXY: CPT

## 2024-11-30 PROCEDURE — 85027 COMPLETE CBC AUTOMATED: CPT

## 2024-12-12 ENCOUNTER — APPOINTMENT (OUTPATIENT)
Dept: PHARMACY | Facility: HOSPITAL | Age: 64
End: 2024-12-12
Payer: COMMERCIAL

## 2024-12-16 ENCOUNTER — APPOINTMENT (OUTPATIENT)
Dept: OBSTETRICS AND GYNECOLOGY | Facility: CLINIC | Age: 64
End: 2024-12-16
Payer: COMMERCIAL

## 2024-12-19 ENCOUNTER — TELEMEDICINE (OUTPATIENT)
Dept: PHARMACY | Facility: HOSPITAL | Age: 64
End: 2024-12-19
Payer: COMMERCIAL

## 2024-12-19 DIAGNOSIS — E66.813 CLASS 3 SEVERE OBESITY DUE TO EXCESS CALORIES WITH SERIOUS COMORBIDITY AND BODY MASS INDEX (BMI) OF 45.0 TO 49.9 IN ADULT: ICD-10-CM

## 2024-12-19 DIAGNOSIS — E11.65 TYPE 2 DIABETES MELLITUS WITH HYPERGLYCEMIA, WITHOUT LONG-TERM CURRENT USE OF INSULIN: Primary | ICD-10-CM

## 2024-12-19 DIAGNOSIS — E11.9 TYPE 2 DIABETES MELLITUS WITHOUT COMPLICATION, WITHOUT LONG-TERM CURRENT USE OF INSULIN (MULTI): ICD-10-CM

## 2024-12-19 DIAGNOSIS — E66.01 CLASS 3 SEVERE OBESITY DUE TO EXCESS CALORIES WITH SERIOUS COMORBIDITY AND BODY MASS INDEX (BMI) OF 45.0 TO 49.9 IN ADULT: ICD-10-CM

## 2024-12-19 PROCEDURE — RXMED WILLOW AMBULATORY MEDICATION CHARGE

## 2024-12-19 ASSESSMENT — ENCOUNTER SYMPTOMS: DIABETIC ASSOCIATED SYMPTOMS: 0

## 2024-12-19 NOTE — PROGRESS NOTES
[facility] Pharmacy Consult  Jackie Malagon is a 64 y.o. female was referred to Clinical Pharmacy Team for a Pharmacy consult.  The patient was referred for their Diabetes.    Referring Provider: Bryson Mejia DO    Subjective   Allergies   Allergen Reactions    Lactose Diarrhea       [id number] - [place], [place] - [address]  [address] [place] [address]  Phone: [phone] Fax: [phone]    [facility]  [address], [address]  [place] [id number]  Phone: [phone] Fax: [phone]      Diabetes  She presents for her follow-up diabetic visit. She has type 2 diabetes mellitus. Her disease course has been stable. There are no hypoglycemic associated symptoms. There are no diabetic associated symptoms. There are no hypoglycemic complications. Symptoms are stable. There are no diabetic complications. Risk factors for coronary artery disease include diabetes mellitus, dyslipidemia and hypertension. She is compliant with treatment all of the time. Her home blood glucose trend is decreasing steadily. Her overall blood glucose range is 110-130 mg/dl.       UTI cleared up but now having some sinus infection. Rybelsus was increased to 14mg dose. Has not started since she did not pick it up. Denies nausea/vomiting, stomach upset, diarrhea/constipation with the 7mg dose.    Review of Systems    Objective     There were no vitals taken for this visit.     LAB  Lab Results   Component Value Date    BILITOT 0.5 11/30/2024    CALCIUM 9.5 11/30/2024    CO2 29 11/30/2024     11/30/2024    CREATININE 1.06 (H) 11/30/2024    GLUCOSE 147 (H) 11/30/2024    ALKPHOS 88 11/30/2024    K 4.3 11/30/2024    PROT 7.7 11/30/2024     11/30/2024    AST 47 (H) 11/30/2024    ALT 37 11/30/2024    BUN 15 11/30/2024    ANIONGAP 13 11/30/2024    ALBUMIN 4.0 11/30/2024    GFRF 68 05/25/2022     Lab Results   Component Value Date    TRIG 167 (H) 11/30/2024    CHOL 155 11/30/2024     LDLCALC 71 11/30/2024    HDL 51.1 11/30/2024     Lab Results   Component Value Date    HGBA1C 6.8 (A) 11/26/2024       Current Outpatient Medications on File Prior to Visit   Medication Sig Dispense Refill    blood sugar diagnostic (Blood Glucose Test) strip Use as directed to test blood sugar once daily. 100 each 11    blood-glucose meter misc Use as directed to test blood sugar once daily. 1 each 0    cholecalciferol (Vitamin D-3) 25 MCG (1000 UT) capsule Take by mouth once daily.      estradiol (Estrace) 0.01 % (0.1 mg/gram) vaginal cream Dime to nickel size amount on your finger and apply to directed area.  3 nights a week at bedtime 42.5 g 3    ezetimibe (Zetia) 10 mg tablet Take 1 tablet (10 mg) by mouth once daily. 90 tablet 2     mg tablet Take 1 tablet (600 mg) by mouth every 6 hours if needed for pain.      lancets misc Use as directed to test blood sugar once daily. 100 each 11    losartan (Cozaar) 50 mg tablet Take 1 tablet (50 mg) by mouth once daily. 90 tablet 2    metoprolol succinate XL (Toprol-XL) 50 mg 24 hr tablet Take 1 tablet (50 mg) by mouth once daily. Do not crush or chew. Rx by Dr. Vinh Zapata 90 tablet 2    mirabegron (Mybetriq) 25 mg tablet extended release 24 hr 24 hr tablet Take 1 tablet (25 mg) by mouth once daily. 30 tablet 2    mirabegron (Mybetriq) 25 mg tablet extended release 24 hr 24 hr tablet Take 1 tablet (25 mg) by mouth once daily. 90 tablet 3    multivitamin tablet Take 1 tablet by mouth once daily.      pravastatin (Pravachol) 40 mg tablet Take 1 tablet (40 mg) by mouth once daily at bedtime. 90 tablet 2    saccharomyces boulardii (Florastor) 250 mg capsule Take 1 capsule (250 mg) by mouth 2 times a day.      vits A,C,E/lutein/minerals (OCUVITE WITH LUTEIN ORAL) Take by mouth.      [DISCONTINUED] semaglutide (Rybelsus) 14 mg tablet tablet Take 1 tablet (14 mg) by mouth once daily. 90 tablet 2     No current facility-administered medications on file prior to visit.         HISTORICAL PHARMACOTHERAPY  -noen    DRUG INTERACTIONS  - none    SECONDARY PREVENTION  - Statin? yes  - ACE-I/ARB? yes    Assessment/Plan   Problem List Items Addressed This Visit       Type 2 diabetes mellitus with hyperglycemia, without long-term current use of insulin - Primary     INCREASE dose of Rybelsus to 14mg subcutaneous once weekly  Educate on side effects of dose increase  Reinforce patient that to maximize weight loss and prevent return weight gain, patient has to combine GLP-1 with good diet and exercise  DASH Diet  150 mins per week of exercise  FUV in 4 weeks regarding side effects and dose titration          Other Visit Diagnoses       Type 2 diabetes mellitus without complication, without long-term current use of insulin (Multi)        Relevant Medications    semaglutide (Rybelsus) 14 mg tablet tablet    Other Relevant Orders    Referral to Clinical Pharmacy             Continue all meds under the continuation of care with the referring provider and clinical pharmacy team.    [place] Walker PharmD     Verbal consent to manage patient's drug therapy was obtained from [the patient and/or an individual authorized to act on behalf of a patient]. They were informed they may decline to participate or withdraw from participation in pharmacy services at any time.

## 2024-12-19 NOTE — ASSESSMENT & PLAN NOTE
INCREASE dose of Rybelsus to 14mg subcutaneous once weekly  Educate on side effects of dose increase  Reinforce patient that to maximize weight loss and prevent return weight gain, patient has to combine GLP-1 with good diet and exercise  DASH Diet  150 mins per week of exercise  FUV in 4 weeks regarding side effects and dose titration

## 2024-12-21 ENCOUNTER — PHARMACY VISIT (OUTPATIENT)
Dept: PHARMACY | Facility: CLINIC | Age: 64
End: 2024-12-21
Payer: COMMERCIAL

## 2025-01-23 ENCOUNTER — APPOINTMENT (OUTPATIENT)
Dept: PHARMACY | Facility: HOSPITAL | Age: 65
End: 2025-01-23
Payer: COMMERCIAL

## 2025-01-23 DIAGNOSIS — E11.65 TYPE 2 DIABETES MELLITUS WITH HYPERGLYCEMIA, WITHOUT LONG-TERM CURRENT USE OF INSULIN: ICD-10-CM

## 2025-01-23 DIAGNOSIS — E11.9 TYPE 2 DIABETES MELLITUS WITHOUT COMPLICATION, WITHOUT LONG-TERM CURRENT USE OF INSULIN (MULTI): Primary | ICD-10-CM

## 2025-01-23 ASSESSMENT — ENCOUNTER SYMPTOMS: DIABETIC ASSOCIATED SYMPTOMS: 0

## 2025-01-23 NOTE — PROGRESS NOTES
[facility] Pharmacy Consult  Jackie Malagon is a 64 y.o. female was referred to Clinical Pharmacy Team for a Pharmacy consult.  The patient was referred for their Diabetes.    Referring Provider: Bryson Mejia DO    Subjective   Allergies   Allergen Reactions    Lactose Diarrhea       [id number] - [place], [place] - [address]  [address] [place] [address]  Phone: [phone] Fax: [phone]    [facility]  [address], [address]  [place] [id number]  Phone: [phone] Fax: [phone]      Diabetes  She presents for her follow-up diabetic visit. She has type 2 diabetes mellitus. Her disease course has been stable. There are no hypoglycemic associated symptoms. There are no diabetic associated symptoms. There are no hypoglycemic complications. Symptoms are stable. There are no diabetic complications. Risk factors for coronary artery disease include diabetes mellitus, dyslipidemia and hypertension. Current diabetic treatment includes oral agent (dual therapy). She is compliant with treatment all of the time. Her home blood glucose trend is decreasing steadily. Her overall blood glucose range is 130-140 mg/dl.     Reports sometimes her sugar is high in the morning.  Review of Systems    Objective     There were no vitals taken for this visit.     LAB  Lab Results   Component Value Date    BILITOT 0.5 11/30/2024    CALCIUM 9.5 11/30/2024    CO2 29 11/30/2024     11/30/2024    CREATININE 1.06 (H) 11/30/2024    GLUCOSE 147 (H) 11/30/2024    ALKPHOS 88 11/30/2024    K 4.3 11/30/2024    PROT 7.7 11/30/2024     11/30/2024    AST 47 (H) 11/30/2024    ALT 37 11/30/2024    BUN 15 11/30/2024    ANIONGAP 13 11/30/2024    ALBUMIN 4.0 11/30/2024    GFRF 68 05/25/2022     Lab Results   Component Value Date    TRIG 167 (H) 11/30/2024    CHOL 155 11/30/2024    LDLCALC 71 11/30/2024    HDL 51.1 11/30/2024     Lab Results   Component Value Date    HGBA1C 6.8 (A)  11/26/2024       Current Outpatient Medications on File Prior to Visit   Medication Sig Dispense Refill    blood sugar diagnostic (Blood Glucose Test) strip Use as directed to test blood sugar once daily. 100 each 11    blood-glucose meter misc Use as directed to test blood sugar once daily. 1 each 0    cholecalciferol (Vitamin D-3) 25 MCG (1000 UT) capsule Take by mouth once daily.      estradiol (Estrace) 0.01 % (0.1 mg/gram) vaginal cream Dime to nickel size amount on your finger and apply to directed area.  3 nights a week at bedtime 42.5 g 3    ezetimibe (Zetia) 10 mg tablet Take 1 tablet (10 mg) by mouth once daily. 90 tablet 2     mg tablet Take 1 tablet (600 mg) by mouth every 6 hours if needed for pain.      lancets misc Use as directed to test blood sugar once daily. 100 each 11    losartan (Cozaar) 50 mg tablet Take 1 tablet (50 mg) by mouth once daily. 90 tablet 2    metoprolol succinate XL (Toprol-XL) 50 mg 24 hr tablet Take 1 tablet (50 mg) by mouth once daily. Do not crush or chew. Rx by Dr. Vinh Zapata 90 tablet 2    mirabegron (Mybetriq) 25 mg tablet extended release 24 hr 24 hr tablet Take 1 tablet (25 mg) by mouth once daily. 90 tablet 3    multivitamin tablet Take 1 tablet by mouth once daily.      pravastatin (Pravachol) 40 mg tablet Take 1 tablet (40 mg) by mouth once daily at bedtime. 90 tablet 2    saccharomyces boulardii (Florastor) 250 mg capsule Take 1 capsule (250 mg) by mouth 2 times a day.      semaglutide (Rybelsus) 14 mg tablet tablet Take 1 tablet (14 mg) by mouth once daily. 90 tablet 3    vits A,C,E/lutein/minerals (OCUVITE WITH LUTEIN ORAL) Take by mouth.       No current facility-administered medications on file prior to visit.        HISTORICAL PHARMACOTHERAPY  -none    DRUG INTERACTIONS  - none    SECONDARY PREVENTION  - Statin? yes  - ACE-I/ARB? yes    Assessment/Plan   Problem List Items Addressed This Visit       Type 2 diabetes mellitus with hyperglycemia, without  long-term current use of insulin     CONTINUE all meds as prescribed  DM is Controlled last A1c of 6.8%  Educate on Hyperglycemia  FUV in 4 weeks          Other Visit Diagnoses       Type 2 diabetes mellitus without complication, without long-term current use of insulin (Multi)    -  Primary    Relevant Orders    Referral to Clinical Pharmacy             Continue all meds under the continuation of care with the referring provider and clinical pharmacy team.    [place] Walker PharmD     Verbal consent to manage patient's drug therapy was obtained from [the patient and/or an individual authorized to act on behalf of a patient]. They were informed they may decline to participate or withdraw from participation in pharmacy services at any time.

## 2025-01-23 NOTE — ASSESSMENT & PLAN NOTE
CONTINUE all meds as prescribed  DM is Controlled last A1c of 6.8%  Educate on Hyperglycemia  FUV in 4 weeks

## 2025-03-17 PROCEDURE — RXMED WILLOW AMBULATORY MEDICATION CHARGE

## 2025-03-18 ENCOUNTER — PHARMACY VISIT (OUTPATIENT)
Dept: PHARMACY | Facility: CLINIC | Age: 65
End: 2025-03-18
Payer: COMMERCIAL

## 2025-04-10 ENCOUNTER — TELEPHONE (OUTPATIENT)
Dept: OPHTHALMOLOGY | Age: 65
End: 2025-04-10
Payer: COMMERCIAL

## 2025-04-10 NOTE — TELEPHONE ENCOUNTER
Pt called triage line regarding decrease in [facility] - like looking through scotch tape. LVM at 4:07pm advising pt to head to main campus ER.

## 2025-04-25 ENCOUNTER — APPOINTMENT (OUTPATIENT)
Dept: PHARMACY | Facility: HOSPITAL | Age: 65
End: 2025-04-25
Payer: COMMERCIAL

## 2025-04-25 DIAGNOSIS — E66.813 CLASS 3 SEVERE OBESITY DUE TO EXCESS CALORIES WITH SERIOUS COMORBIDITY AND BODY MASS INDEX (BMI) OF 45.0 TO 49.9 IN ADULT: ICD-10-CM

## 2025-04-25 DIAGNOSIS — E11.65 TYPE 2 DIABETES MELLITUS WITH HYPERGLYCEMIA, WITHOUT LONG-TERM CURRENT USE OF INSULIN: ICD-10-CM

## 2025-04-25 DIAGNOSIS — E66.01 CLASS 3 SEVERE OBESITY DUE TO EXCESS CALORIES WITH SERIOUS COMORBIDITY AND BODY MASS INDEX (BMI) OF 45.0 TO 49.9 IN ADULT: ICD-10-CM

## 2025-04-25 DIAGNOSIS — E11.9 TYPE 2 DIABETES MELLITUS WITHOUT COMPLICATION, WITHOUT LONG-TERM CURRENT USE OF INSULIN: Primary | ICD-10-CM

## 2025-04-25 NOTE — ASSESSMENT & PLAN NOTE
CONTINUE all meds as prescribed  DM is Controlled, need new A1c. Will get it at next PCP visit on 5/20  Educate on Hypoglycemia  FUV in 4 weeks

## 2025-04-25 NOTE — PROGRESS NOTES
"[facility] Pharmacy Consult  Jackie Malagon is a 64 y.o. female was referred to Clinical Pharmacy Team for a Pharmacy consult.  The patient was referred for their Diabetes.    Referring Provider: Bryson Mejia DO - 5/20/25    Subjective   Allergies[1]    [id number] - [place], [place] - [address]  [address] [place] [address]  Phone: [phone] Fax: [phone]    [facility]  [address], [address]  [place] [id number]  Phone: [phone] Fax: [phone]      [facility]  DIABETES MELLITUS TYPE 2:    Diagnosed with diabetes:  > 2 years.   Known diabetic complications: none.  Does patient follow with Endocrinology: Yes  Last optometry exam: 6/10  Most recent visit in Podiatry: no-- patient denies sores or cuts on feet today      Current diabetic medications include:  Rybelsus 14mg daily    Clarifications to above regimen: none  Adverse Effects: none    Past diabetic medications include:      Glucose Readings:  Glucometer/CGM Type: Glucometer  Patient tests BG 1-3 times per week    Current home BG readings (mg/dL): 130 mg/dL   Previous home BG readings (mg/dL): -    Any episodes of hypoglycemia? No,   .  Did patient treat episode of hypoglycemia appropriately? No,    Does the patient have a prescription for ready-to-use Glucagon? Not on insulin    Lifestyle:  Diet: 1-2 meals/day.   Home cook meals  Snacks: sometimes  Drinks: Water  Exercise: exercises at least 3 times a week  Activity type: Type of Exercise : stretching and Yoga  Is limited by:  no  Tobacco history: no    Secondary Prevention:  Statin? Yes  ACE-I/ARB? Yes  Aspirin? No    Pertinent PMH Review:  PMH of Pancreatitis: No  PMH of Retinopathy: No  PMH of Urinary Tract Infections: No  PMH of MTC: No  PMH of MEN2: No  UACR/EGFR in last year?: No  No results found for: \"MICROALBCREA\"    Immunizations:  Influenza? No  COVID? Yes  Pneumonia? No  Shingles? Yes  Hepatitis B? Yes  Review of Systems    Objective "     There were no vitals taken for this visit.     LAB  Lab Results   Component Value Date    BILITOT 0.5 11/30/2024    CALCIUM 9.5 11/30/2024    CO2 29 11/30/2024     11/30/2024    CREATININE 1.06 (H) 11/30/2024    GLUCOSE 147 (H) 11/30/2024    ALKPHOS 88 11/30/2024    K 4.3 11/30/2024    PROT 7.7 11/30/2024     11/30/2024    AST 47 (H) 11/30/2024    ALT 37 11/30/2024    BUN 15 11/30/2024    ANIONGAP 13 11/30/2024    ALBUMIN 4.0 11/30/2024    GFRF 68 05/25/2022     Lab Results   Component Value Date    TRIG 167 (H) 11/30/2024    CHOL 155 11/30/2024    LDLCALC 71 11/30/2024    HDL 51.1 11/30/2024     Lab Results   Component Value Date    HGBA1C 6.8 (A) 11/26/2024       Medications Ordered Prior to Encounter[2]     Assessment/Plan   Problem List Items Addressed This Visit       Class 3 severe obesity due to excess calories with serious comorbidity and body mass index (BMI) of 45.0 to 49.9 in adult    Relevant Orders    Referral to Clinical Pharmacy    Type 2 diabetes mellitus with hyperglycemia, without long-term current use of insulin    CONTINUE all meds as prescribed  DM is Controlled, need new A1c. Will get it at next PCP visit on 5/20  Educate on Hypoglycemia  FUV in 4 weeks         Relevant Orders    Referral to Clinical Pharmacy     Other Visit Diagnoses         Type 2 diabetes mellitus without complication, without long-term current use of insulin    -  Primary    Relevant Orders    Referral to Clinical Pharmacy             Continue all meds under the continuation of care with the referring provider and clinical pharmacy team.    [place] Walker PharmD     Verbal consent to manage patient's drug therapy was obtained from [the patient and/or an individual authorized to act on behalf of a patient]. They were informed they may decline to participate or withdraw from participation in pharmacy services at any time.       [1]   Allergies  Allergen Reactions    Lactose Diarrhea   [2]   Current Outpatient  Medications on File Prior to Visit   Medication Sig Dispense Refill    blood sugar diagnostic (Blood Glucose Test) strip Use as directed to test blood sugar once daily. 100 each 11    blood-glucose meter misc Use as directed to test blood sugar once daily. 1 each 0    cholecalciferol (Vitamin D-3) 25 MCG (1000 UT) capsule Take by mouth once daily.      estradiol (Estrace) 0.01 % (0.1 mg/gram) vaginal cream Dime to nickel size amount on your finger and apply to directed area.  3 nights a week at bedtime 42.5 g 3    ezetimibe (Zetia) 10 mg tablet Take 1 tablet (10 mg) by mouth once daily. 90 tablet 2     mg tablet Take 1 tablet (600 mg) by mouth every 6 hours if needed for pain.      lancets misc Use as directed to test blood sugar once daily. 100 each 11    losartan (Cozaar) 50 mg tablet Take 1 tablet (50 mg) by mouth once daily. 90 tablet 2    metoprolol succinate XL (Toprol-XL) 50 mg 24 hr tablet Take 1 tablet (50 mg) by mouth once daily. Do not crush or chew. Rx by Dr. Vinh Zapata 90 tablet 2    mirabegron (Mybetriq) 25 mg tablet extended release 24 hr 24 hr tablet Take 1 tablet (25 mg) by mouth once daily. 90 tablet 3    multivitamin tablet Take 1 tablet by mouth once daily.      pravastatin (Pravachol) 40 mg tablet Take 1 tablet (40 mg) by mouth once daily at bedtime. 90 tablet 2    saccharomyces boulardii (Florastor) 250 mg capsule Take 1 capsule (250 mg) by mouth 2 times a day.      semaglutide (Rybelsus) 14 mg tablet tablet Take 1 tablet (14 mg) by mouth once daily. 90 tablet 3    vits A,C,E/lutein/minerals (OCUVITE WITH LUTEIN ORAL) Take by mouth.       No current facility-administered medications on file prior to visit.

## 2025-05-18 LAB
ALBUMIN SERPL-MCNC: 4.2 G/DL (ref 3.6–5.1)
ALP SERPL-CCNC: 77 U/L (ref 37–153)
ALT SERPL-CCNC: 29 U/L (ref 6–29)
ANION GAP SERPL CALCULATED.4IONS-SCNC: 10 MMOL/L (CALC) (ref 7–17)
AST SERPL-CCNC: 42 U/L (ref 10–35)
BILIRUB SERPL-MCNC: 0.8 MG/DL (ref 0.2–1.2)
BUN SERPL-MCNC: 15 MG/DL (ref 7–25)
CALCIUM SERPL-MCNC: 10 MG/DL (ref 8.6–10.4)
CHLORIDE SERPL-SCNC: 100 MMOL/L (ref 98–110)
CO2 SERPL-SCNC: 28 MMOL/L (ref 20–32)
CREAT SERPL-MCNC: 1.01 MG/DL (ref 0.5–1.05)
EGFRCR SERPLBLD CKD-EPI 2021: 61 ML/MIN/1.73M2
GLUCOSE SERPL-MCNC: 173 MG/DL (ref 65–99)
POTASSIUM SERPL-SCNC: 4.7 MMOL/L (ref 3.5–5.3)
PROT SERPL-MCNC: 7.5 G/DL (ref 6.1–8.1)
SODIUM SERPL-SCNC: 138 MMOL/L (ref 135–146)

## 2025-05-20 ENCOUNTER — APPOINTMENT (OUTPATIENT)
Dept: PRIMARY CARE | Facility: CLINIC | Age: 65
End: 2025-05-20
Payer: COMMERCIAL

## 2025-05-20 VITALS
BODY MASS INDEX: 45.98 KG/M2 | DIASTOLIC BLOOD PRESSURE: 84 MMHG | HEIGHT: 65 IN | WEIGHT: 276 LBS | SYSTOLIC BLOOD PRESSURE: 126 MMHG | HEART RATE: 68 BPM | OXYGEN SATURATION: 99 %

## 2025-05-20 DIAGNOSIS — E66.813 CLASS 3 SEVERE OBESITY DUE TO EXCESS CALORIES WITH SERIOUS COMORBIDITY AND BODY MASS INDEX (BMI) OF 45.0 TO 49.9 IN ADULT: ICD-10-CM

## 2025-05-20 DIAGNOSIS — E11.9 TYPE 2 DIABETES MELLITUS WITHOUT COMPLICATION, WITHOUT LONG-TERM CURRENT USE OF INSULIN: Primary | ICD-10-CM

## 2025-05-20 DIAGNOSIS — N95.2 VAGINAL ATROPHY: ICD-10-CM

## 2025-05-20 DIAGNOSIS — I10 HYPERTENSION, UNSPECIFIED TYPE: ICD-10-CM

## 2025-05-20 DIAGNOSIS — E78.5 HYPERLIPIDEMIA, UNSPECIFIED HYPERLIPIDEMIA TYPE: ICD-10-CM

## 2025-05-20 DIAGNOSIS — C54.1 ENDOMETRIAL CANCER (MULTI): ICD-10-CM

## 2025-05-20 DIAGNOSIS — J01.00 ACUTE NON-RECURRENT MAXILLARY SINUSITIS: ICD-10-CM

## 2025-05-20 DIAGNOSIS — E11.65 TYPE 2 DIABETES MELLITUS WITH HYPERGLYCEMIA, WITHOUT LONG-TERM CURRENT USE OF INSULIN: ICD-10-CM

## 2025-05-20 DIAGNOSIS — I47.10 PSVT (PAROXYSMAL SUPRAVENTRICULAR TACHYCARDIA): ICD-10-CM

## 2025-05-20 LAB — POC HEMOGLOBIN A1C: 7.2 % (ref 4.2–6.5)

## 2025-05-20 PROCEDURE — 3074F SYST BP LT 130 MM HG: CPT | Performed by: FAMILY MEDICINE

## 2025-05-20 PROCEDURE — 99215 OFFICE O/P EST HI 40 MIN: CPT | Performed by: FAMILY MEDICINE

## 2025-05-20 PROCEDURE — 1159F MED LIST DOCD IN RCRD: CPT | Performed by: FAMILY MEDICINE

## 2025-05-20 PROCEDURE — 3079F DIAST BP 80-89 MM HG: CPT | Performed by: FAMILY MEDICINE

## 2025-05-20 PROCEDURE — 1160F RVW MEDS BY RX/DR IN RCRD: CPT | Performed by: FAMILY MEDICINE

## 2025-05-20 PROCEDURE — 4010F ACE/ARB THERAPY RXD/TAKEN: CPT | Performed by: FAMILY MEDICINE

## 2025-05-20 PROCEDURE — 3051F HG A1C>EQUAL 7.0%<8.0%: CPT | Performed by: FAMILY MEDICINE

## 2025-05-20 PROCEDURE — 1036F TOBACCO NON-USER: CPT | Performed by: FAMILY MEDICINE

## 2025-05-20 PROCEDURE — 3008F BODY MASS INDEX DOCD: CPT | Performed by: FAMILY MEDICINE

## 2025-05-20 PROCEDURE — 83036 HEMOGLOBIN GLYCOSYLATED A1C: CPT | Performed by: FAMILY MEDICINE

## 2025-05-20 RX ORDER — PRAVASTATIN SODIUM 40 MG/1
40 TABLET ORAL NIGHTLY
Qty: 90 TABLET | Refills: 2 | Status: SHIPPED | OUTPATIENT
Start: 2025-05-20

## 2025-05-20 RX ORDER — ESTRADIOL 0.1 MG/G
CREAM VAGINAL
Qty: 42.5 G | Refills: 3 | Status: SHIPPED | OUTPATIENT
Start: 2025-05-20

## 2025-05-20 RX ORDER — EZETIMIBE 10 MG/1
10 TABLET ORAL DAILY
Qty: 90 TABLET | Refills: 2 | Status: SHIPPED | OUTPATIENT
Start: 2025-05-20

## 2025-05-20 RX ORDER — METFORMIN HYDROCHLORIDE 500 MG/1
1000 TABLET, EXTENDED RELEASE ORAL
Qty: 180 TABLET | Refills: 2 | Status: SHIPPED | OUTPATIENT
Start: 2025-05-20 | End: 2025-10-02

## 2025-05-20 RX ORDER — LOSARTAN POTASSIUM 50 MG/1
50 TABLET ORAL DAILY
Qty: 90 TABLET | Refills: 2 | Status: SHIPPED | OUTPATIENT
Start: 2025-05-20

## 2025-05-20 RX ORDER — CEFDINIR 300 MG/1
300 CAPSULE ORAL 2 TIMES DAILY
Qty: 20 CAPSULE | Refills: 0 | Status: SHIPPED | OUTPATIENT
Start: 2025-05-20 | End: 2025-05-30

## 2025-05-20 RX ORDER — BLOOD-GLUCOSE SENSOR
EACH MISCELLANEOUS
Qty: 2 EACH | Refills: 5 | Status: SHIPPED | OUTPATIENT
Start: 2025-05-20

## 2025-05-20 RX ORDER — IBUPROFEN 200 MG
CAPSULE ORAL
Qty: 100 EACH | Refills: 11 | Status: SHIPPED | OUTPATIENT
Start: 2025-05-20

## 2025-05-20 RX ORDER — METOPROLOL SUCCINATE 50 MG/1
50 TABLET, EXTENDED RELEASE ORAL DAILY
Qty: 90 TABLET | Refills: 2 | Status: SHIPPED | OUTPATIENT
Start: 2025-05-20 | End: 2026-02-14

## 2025-05-20 NOTE — PATIENT INSTRUCTIONS
Oscal + D or Citrical.   Goal is 1200 - 1500 mg calcium daily.      Mucinex to thin out fluid.

## 2025-05-20 NOTE — PROGRESS NOTES
General Medical Management Note    65 y.o. female presents for Medical Management  HPI  DM2 - cut out some carbohydrates.  Not exercising.  Compliant with medications including Rybelsus.  Working with Clinical Pharmacist.  Is interested in Continuous Glucose Monitor (CGM)    Weight management failing at significant and sustained weight loss.  Has lost some weight.      Facial pressure, PND, ears clogged, cough.  No fever.  Sx x 2 wks.    HTN controlled.  Taking ARB and Statin    Atrophic Vaginitis - not using Estrogen cream regularly.    OAB - managed by Urology.  Doesn't think Myrbetriq is effective.  Has appointment with Urology soon.    Medical History[1]   Surgical History[2]  Family History[3]   Social History     Socioeconomic History    Marital status:      Spouse name: Not on file    Number of children: Not on file    Years of education: Not on file    Highest education level: Not on file   Occupational History    Not on file   Tobacco Use    Smoking status: Former     Current packs/day: 0.00     Average packs/day: 0.5 packs/day for 10.0 years (5.0 ttl pk-yrs)     Types: Cigarettes     Start date: 1977     Quit date: 3/10/1987     Years since quittin.2    Smokeless tobacco: Never    Tobacco comments:     Quit 1987.  The day I found out i was pregnant with my son   Vaping Use    Vaping status: Never Used   Substance and Sexual Activity    Alcohol use: Never    Drug use: Never    Sexual activity: Not Currently     Partners: Male     Birth control/protection: Abstinence   Other Topics Concern    Not on file   Social History Narrative    Not on file     Social Drivers of Health     Financial Resource Strain: Not on file   Food Insecurity: Not on file   Transportation Needs: Not on file   Physical Activity: Not on file   Stress: Not on file   Social Connections: Not on file   Intimate Partner Violence: Not on file   Housing Stability: Not on file       Medications Ordered Prior to  "Encounter[4]    Allergies[5]      ROS: Denies chest pain, SOB, Headache, GI problems     Visit Vitals  /84   Pulse 68   Ht 1.651 m (5' 5\")   Wt 125 kg (276 lb)   SpO2 99%   BMI 45.93 kg/m²   OB Status Hysterectomy   Smoking Status Former   BSA 2.39 m²      Vitals:    05/20/25 1422   BP: 126/84   Pulse: 68   SpO2: 99%   Weight: 125 kg (276 lb)   Height: 1.651 m (5' 5\")       PHYSICAL EXAM:  Alert and oriented x3.  Eyes: EOM grossly intact.  Tms with serous otitis media.  Pharynx is normal.    Neck supple without lymph adenopathy or carotid bruit.  No masses or thyromegaly  Heart regular rate and rhythm without murmur.  Lungs clear to auscultation.  Legs without edema.  Gait is non-antalgic  Speech clear.  Hearing adequate.          DIAGNOSIS/PLAN:  1. Primary hypertension  - Comprehensive Metabolic Panel; Future  - Comprehensive Metabolic Panel  - refer to HTN below    2. Type 2 diabetes mellitus without complication, without long-term current use of insulin (Primary)  - encouraged more physical activity.    - educated on use of CGM;  continue monitoring with Clinical Pharmacist  - POCT glycosylated hemoglobin (Hb A1C) manually resulted  - blood sugar diagnostic (Blood Glucose Test); Use as directed to test blood sugar once daily.  Dispense: 100 each; Refill: 11  - semaglutide (Rybelsus) 14 mg tablet tablet; Take 1 tablet (14 mg) by mouth once daily.  Dispense: 90 tablet; Refill: 3  - metFORMIN XR (Glucophage-XR) 500 mg 24 hr tablet; Take 2 tablets (1,000 mg) by mouth 2 times daily (morning and late afternoon). Do not crush, chew, or split.  Dispense: 180 tablet; Refill: 2    3. PSVT (paroxysmal supraventricular tachycardia)  stable    4. Endometrial cancer (Multi)  Monitored by UroGyn    5. Type 2 diabetes mellitus with hyperglycemia, without long-term current use of insulin  Diabetes:  Patient aware of diabetes goals including hemoglobin A1c less than 7%, blood pressure less than 130/80, and LDL cholesterol " less than 100.  Discussed importance of daily exercise as well as following an American diabetes Association diet for best control.  Also discussed potential complications due to uncontrolled diabetes including heart attack, stroke, kidney failure, peripheral neuropathy, impaired vision and amputations    - blood sugar diagnostic (Blood Glucose Test); Use as directed to test blood sugar once daily.  Dispense: 100 each; Refill: 11  - Dexcom G7 Sensor device; Check blood sugar AC and HS  Dispense: 2 each; Refill: 5    6. Class 3 severe obesity due to excess calories with serious comorbidity and body mass index (BMI) of 45.0 to 49.9 in adult  Weight Management:        Patient encouraged to commit to a diet of lower carbohydrates and increase vegetable and fruit intake. Patient also encouraged to increase water intake to 80 ounces/day. Continue exercise for at least 30 minutes a day on most days of the week.  Sustained obesity leads to increased risk for multiple medical problems including heart attack, stroke, cancer and infection.  For more assistance and weight loss options, go to the website: Yourweightmatters.org.  Additional resources:  RethinkObesity.com, obesity.org, obesityaction.org, PressPad.com    - blood sugar diagnostic (Blood Glucose Test); Use as directed to test blood sugar once daily.  Dispense: 100 each; Refill: 11    7. Vaginal atrophy   - encouraged twice weekly topical estrogen  - estradiol (Estrace) 0.01 % (0.1 mg/gram) vaginal cream; Dime to nickel size amount on your finger and apply to directed area.  3 nights a week at bedtime  Dispense: 42.5 g; Refill: 3    8. Hyperlipidemia, unspecified hyperlipidemia type  - ezetimibe (Zetia) 10 mg tablet; Take 1 tablet (10 mg) by mouth once daily.  Dispense: 90 tablet; Refill: 2  - pravastatin (Pravachol) 40 mg tablet; Take 1 tablet (40 mg) by mouth once daily at bedtime.  Dispense: 90 tablet; Refill: 2    9. Hypertension, unspecified type  - losartan  (Cozaar) 50 mg tablet; Take 1 tablet (50 mg) by mouth once daily.  Dispense: 90 tablet; Refill: 2  - metoprolol succinate XL (Toprol-XL) 50 mg 24 hr tablet; Take 1 tablet (50 mg) by mouth once daily. Do not crush or chew. Rx by Dr. Vinh Zapata  Dispense: 90 tablet; Refill: 2    10. Acute non-recurrent maxillary sinusitis  - mucinex BID and increase fluid intake  - cefdinir (Omnicef) 300 mg capsule; Take 1 capsule (300 mg) by mouth 2 times a day for 10 days.  Dispense: 20 capsule; Refill: 0      Return to office in 6 months for comprehensive medical evaluation, long-term medication use monitoring, and preventative services screening    Will continue to monitor, evaluate, assess and treat all problems/diagnoses as appropriate and continue to collaborate with specialists.    Encouraged to sign up with  [employer]    Contact office or send a  [employer] message with any questions or concerns    Patient will only be notified of labs that require medical intervention.    Prescriptions will not be filled unless you are compliant with your follow up appointments or have a follow up appointment scheduled as per instruction of your physician. Refills should be requested at the time of your visit.    **Charting was completed using voice recognition technology and may include unintended errors**    Bryson Mejia DO, FACOFP  Senior Attending Physician  [facility]  [address]  [place] [address] [phone]     Bryson Mejia DO, FACOFP           [1]   Past Medical History:  Diagnosis Date    Arthritis 2022    Cancer (Multi) May 2022    Diabetes mellitus (Multi) 2022    GERD (gastroesophageal reflux disease) 2021    Hypertension Unknown     Urinary incontinence     Urinary tract infection     Uterine cancer (Multi) May 2022   [2]   Past Surgical History:  Procedure Laterality Date     SECTION, LOW TRANSVERSE  3-12-83 & 1987     CHOLECYSTECTOMY  2013    HYSTERECTOMY  5-    TUBAL LIGATION  12-5-1987   [3]   Family History  Problem Relation Name Age of Onset    Cancer Father Bryson Carrillo     Cancer Maternal Grandmother Florecita Bennett     Cancer Mother's Brother Lefty Bennett     Cancer Mother's Brother Twin Bennett    [4]   Current Outpatient Medications on File Prior to Visit   Medication Sig Dispense Refill    blood sugar diagnostic (Blood Glucose Test) strip Use as directed to test blood sugar once daily. 100 each 11    blood-glucose meter misc Use as directed to test blood sugar once daily. 1 each 0    cholecalciferol (Vitamin D-3) 25 MCG (1000 UT) capsule Take by mouth once daily.      estradiol (Estrace) 0.01 % (0.1 mg/gram) vaginal cream Dime to nickel size amount on your finger and apply to directed area.  3 nights a week at bedtime 42.5 g 3    ezetimibe (Zetia) 10 mg tablet Take 1 tablet (10 mg) by mouth once daily. 90 tablet 2     mg tablet Take 1 tablet (600 mg) by mouth every 6 hours if needed for pain.      lancets misc Use as directed to test blood sugar once daily. 100 each 11    losartan (Cozaar) 50 mg tablet Take 1 tablet (50 mg) by mouth once daily. 90 tablet 2    metoprolol succinate XL (Toprol-XL) 50 mg 24 hr tablet Take 1 tablet (50 mg) by mouth once daily. Do not crush or chew. Rx by Dr. Vinh Zapata 90 tablet 2    mirabegron (Mybetriq) 25 mg tablet extended release 24 hr 24 hr tablet Take 1 tablet (25 mg) by mouth once daily. 90 tablet 3    multivitamin tablet Take 1 tablet by mouth once daily.      pravastatin (Pravachol) 40 mg tablet Take 1 tablet (40 mg) by mouth once daily at bedtime. 90 tablet 2    semaglutide (Rybelsus) 14 mg tablet tablet Take 1 tablet (14 mg) by mouth once daily. 90 tablet 3    vits A,C,E/lutein/minerals (OCUVITE WITH LUTEIN ORAL) Take by mouth.      saccharomyces boulardii (Florastor) 250 mg capsule Take 1 capsule (250 mg) by mouth 2 times a day. (Patient not taking: Reported  on 5/20/2025)       No current facility-administered medications on file prior to visit.   [5]   Allergies  Allergen Reactions    Lactose Diarrhea

## 2025-05-23 ENCOUNTER — APPOINTMENT (OUTPATIENT)
Dept: PHARMACY | Facility: HOSPITAL | Age: 65
End: 2025-05-23
Payer: COMMERCIAL

## 2025-05-23 DIAGNOSIS — E11.9 TYPE 2 DIABETES MELLITUS WITHOUT COMPLICATION, WITHOUT LONG-TERM CURRENT USE OF INSULIN: ICD-10-CM

## 2025-05-23 DIAGNOSIS — E66.813 CLASS 3 SEVERE OBESITY DUE TO EXCESS CALORIES WITH SERIOUS COMORBIDITY AND BODY MASS INDEX (BMI) OF 45.0 TO 49.9 IN ADULT: ICD-10-CM

## 2025-05-23 DIAGNOSIS — E11.65 TYPE 2 DIABETES MELLITUS WITH HYPERGLYCEMIA, WITHOUT LONG-TERM CURRENT USE OF INSULIN: ICD-10-CM

## 2025-05-23 RX ORDER — DEXTROSE 4 G
TABLET,CHEWABLE ORAL
Qty: 1 EACH | Refills: 0 | Status: SHIPPED | OUTPATIENT
Start: 2025-05-23

## 2025-05-23 NOTE — PROGRESS NOTES
Patient ID: Jackie Malagon is a 65 y.o. female.  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 She states she is overall doing well since her procedure but continues to have intermittent LE swelling with negative  BLE Dopplers for DVT.      Treatment History:  22: aborted diagnostic laparoscopy, lysis of adhesion - transvaginal hysterectomy with bilateral salpingo-oophorectomy (incomplete surgical staging)      Recent Pathology:  22: D&C   A.  Endocervix, curettage:                                           -- Very scant endocervical epithelium with no significant pathologic findings.          -- Scant superficial strips of inactive endometrial epithelium.                                              -- Specimen consists predominantly of blood.                                                                    B.  Endometrium and polyps, curettage:                                                 -- Endometrial adenocarcinoma, endometrioid type with squamous differentiation, FIGO grade 1, in the background of atypical endometrial hyperplasia.  See note.  -- Fragments of benign smooth muscle.  MMR Protein Expression  Protein:         Result:            MLH-1           Positive                                                PMS-2           Positive                                                  MSH-2           Positive                                                MSH-6           Positive  ==================================  Medical History:  - BMI 51  - Hypertension  - HLD   - GERD  - Stress urinary incontinence      Surgical History:  - C/section x2 (via midline incision)  - Cholecystectomy      Obstetric/Gynecologic History:   - Menarche age 12  - Menopausal since   - History of OCP or HRT use: OCPs x1 year, denies  HRT  - Last Pap: 2/2022     Family History:   - Mother: melanoma  - Mat. uncle: lung cancer  - MGM: breast cancer      Social History: works as , identifies as Sabianism. 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  Here for 6 month follow up.  Started on Estrace last visit for recurrent UTI and vaginal atrophy.  Patient on Myrbetriq, states it has stopped working this week.  Also having a strong vaginal odor she states.  Slight yellow discharge.  Denies any bleeding.  Considering getting gastric sleeve.  She has been making diet changes, has lost some weight.  States she has been using Estrace.        Physical Exam:    Constitutional: Doing well. NAKIA  Eyes: PERRL  ENMT: Moist mucus membranes  Head/Neck: Supple. Symmetrical  Cardiovascular: Regular, rate and rhythm. 2+ equal pulses of the extremities  Respiratory/Thorax: CTA. RRR. Chest rise symmetrical.  Gastrointestinal: Non-distended, soft, non-tender  Genitourinary:   Normal external female genitalia. No vulvar lesions noted  Speculum exam: Smooth vaginal walls without lesions or masses. Vaginal cuff visualized without lesions.  Bimanual exam: Smooth vaginal wall without lesions or masses.  Surgically absent uterus, cervix, and adnexa.    Rectovaginal exam: smooth rectovaginal septum without lesions or masses +rectocele  Musculoskeletal: ROM intact, no joint swelling, normal  strength  Extremities: No edema  Neurological: Alert and oriented x 3. Pleasant and cooperative.  Lymphatic: No lymphadenopathy. No lymphedema  Psychological: Appropriate mood and behavior  Skin: Warm and dry, no lesions, no rashes    A complete review of systems was performed and all systems were normal except what is noted in the interval history.        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 NAKIA 3 years.  Urine odor.        PLAN: Urine culture today, F/U results  F/U in 6 months or as needed  Physical examination was within normal limits today.  She is currently NAKIA.  We reviewed signs and symptoms of possible recurrence with the patient and she will call our office should she experience any of these.

## 2025-05-23 NOTE — PROGRESS NOTES
"[facility] Pharmacy Consult  Jakcie Malagon is a 65 y.o. female was referred to Clinical Pharmacy Team for a Pharmacy consult.  The patient was referred for their Diabetes.    Referring Provider: Bryson Mejia, DO    Subjective   Allergies[1]    [id number] - [place], [place] - [address] [place] [address]  [address] [address]  Phone: [phone] Fax: [phone]    [facility]  [address], [address]  [facility] [address]  Phone: [phone] Fax: [phone]      [facility] [place]  DIABETES MELLITUS TYPE 2:    Diagnosed with diabetes:  > 2 years.   Known diabetic complications: none.  Does patient follow with Endocrinology: Yes  Last optometry exam: 6/10  Most recent visit in Podiatry: no-- patient denies sores or cuts on feet today     Patient reported she lost her glucometer while driving. Needs a new glucometer     Current diabetic medications include:  Rybelsus 14mg daily  Metformin 500mg 2 tabs BID, to start at 1 tab and titrate up - recently restarted by PCP     Clarifications to above regimen: none  Adverse Effects: none     Past diabetic medications include:        Glucose Readings:  Glucometer/CGM Type: Glucometer  Patient tests BG 1-3 times per week     Current home BG readings (mg/dL): 145 mg/dL   Previous home BG readings (mg/dL): 140     Any episodes of hypoglycemia? No,  .  Did patient treat episode of hypoglycemia appropriately? No,    Does the patient have a prescription for ready-to-use Glucagon? Not on insulin     Secondary Prevention:  Statin? Yes  ACE-I/ARB? Yes  Aspirin? No     Pertinent PMH Review:  PMH of Pancreatitis: No  PMH of Retinopathy: No  PMH of Urinary Tract Infections: No  PMH of MTC: No  PMH of MEN2: No  UACR/EGFR in last year?: No  No results found for: \"MICROALBCREA\"     Immunizations:  Influenza? No  COVID? Yes  Pneumonia? No  Shingles? Yes  Hepatitis B? Yes  Review of Systems    Objective     There were no vitals taken for this visit.     LAB  Lab " Results   Component Value Date    BILITOT 0.8 05/17/2025    CALCIUM 10.0 05/17/2025    CO2 28 05/17/2025     05/17/2025    CREATININE 1.01 05/17/2025    GLUCOSE 173 (H) 05/17/2025    ALKPHOS 77 05/17/2025    K 4.7 05/17/2025    PROT 7.5 05/17/2025     05/17/2025    AST 42 (H) 05/17/2025    ALT 29 05/17/2025    BUN 15 05/17/2025    ANIONGAP 10 05/17/2025    ALBUMIN 4.2 05/17/2025    GFRF 68 05/25/2022     Lab Results   Component Value Date    TRIG 167 (H) 11/30/2024    CHOL 155 11/30/2024    LDLCALC 71 11/30/2024    HDL 51.1 11/30/2024     Lab Results   Component Value Date    HGBA1C 7.2 (A) 05/20/2025       Medications Ordered Prior to Encounter[2]     Assessment/Plan   Problem List Items Addressed This Visit       Class 3 severe obesity due to excess calories with serious comorbidity and body mass index (BMI) of 45.0 to 49.9 in adult    Type 2 diabetes mellitus with hyperglycemia, without long-term current use of insulin    CONTINUE all meds as prescribed  DM is Uncontrolled, last A1c of 7.2%. Recently restarted metfromin.  Educate on diet and exercise  Educate on Hypoglycemia  Pt due to protein/albumin levels, will defer to PCP to obtain at next visit  Discussed in depths regarding risk and benefits of CGM. Pt not indicated for Cgm at this time, no hx of glucose fluctuation and no on insulin  FUV in 3 months          Other Visit Diagnoses         Type 2 diabetes mellitus without complication, without long-term current use of insulin                 Continue all meds under the continuation of care with the referring provider and clinical pharmacy team.    Eduardo Walker PharmD     Verbal consent to manage patient's drug therapy was obtained from [the patient and/or an individual authorized to act on behalf of a patient]. They were informed they may decline to participate or withdraw from participation in pharmacy services at any time.       [1]   Allergies  Allergen Reactions    Lactose Diarrhea   [2]    Current Outpatient Medications on File Prior to Visit   Medication Sig Dispense Refill    blood sugar diagnostic (Blood Glucose Test) Use as directed to test blood sugar once daily. 100 each 11    blood-glucose meter misc Use as directed to test blood sugar once daily. 1 each 0    cefdinir (Omnicef) 300 mg capsule Take 1 capsule (300 mg) by mouth 2 times a day for 10 days. 20 capsule 0    cholecalciferol (Vitamin D-3) 25 MCG (1000 UT) capsule Take by mouth once daily.      Dexcom G7 Sensor device Check blood sugar AC and HS 2 each 5    estradiol (Estrace) 0.01 % (0.1 mg/gram) vaginal cream Dime to nickel size amount on your finger and apply to directed area.  3 nights a week at bedtime 42.5 g 3    ezetimibe (Zetia) 10 mg tablet Take 1 tablet (10 mg) by mouth once daily. 90 tablet 2     mg tablet Take 1 tablet (600 mg) by mouth every 6 hours if needed for pain.      lancets misc Use as directed to test blood sugar once daily. 100 each 11    losartan (Cozaar) 50 mg tablet Take 1 tablet (50 mg) by mouth once daily. 90 tablet 2    metFORMIN XR (Glucophage-XR) 500 mg 24 hr tablet Take 2 tablets (1,000 mg) by mouth 2 times daily (morning and late afternoon). Do not crush, chew, or split. 180 tablet 2    metoprolol succinate XL (Toprol-XL) 50 mg 24 hr tablet Take 1 tablet (50 mg) by mouth once daily. Do not crush or chew. Rx by Dr. Vinh Zapata 90 tablet 2    mirabegron (Mybetriq) 25 mg tablet extended release 24 hr 24 hr tablet Take 1 tablet (25 mg) by mouth once daily. 90 tablet 3    multivitamin tablet Take 1 tablet by mouth once daily.      pravastatin (Pravachol) 40 mg tablet Take 1 tablet (40 mg) by mouth once daily at bedtime. 90 tablet 2    semaglutide (Rybelsus) 14 mg tablet tablet Take 1 tablet (14 mg) by mouth once daily. 90 tablet 3    vits A,C,E/lutein/minerals (OCUVITE WITH LUTEIN ORAL) Take by mouth.      [DISCONTINUED] blood sugar diagnostic (Blood Glucose Test) strip Use as directed to test blood  sugar once daily. 100 each 11    [DISCONTINUED] estradiol (Estrace) 0.01 % (0.1 mg/gram) vaginal cream Dime to nickel size amount on your finger and apply to directed area.  3 nights a week at bedtime 42.5 g 3    [DISCONTINUED] ezetimibe (Zetia) 10 mg tablet Take 1 tablet (10 mg) by mouth once daily. 90 tablet 2    [DISCONTINUED] losartan (Cozaar) 50 mg tablet Take 1 tablet (50 mg) by mouth once daily. 90 tablet 2    [DISCONTINUED] metoprolol succinate XL (Toprol-XL) 50 mg 24 hr tablet Take 1 tablet (50 mg) by mouth once daily. Do not crush or chew. Rx by Dr. Vinh Zapata 90 tablet 2    [DISCONTINUED] pravastatin (Pravachol) 40 mg tablet Take 1 tablet (40 mg) by mouth once daily at bedtime. 90 tablet 2    [DISCONTINUED] saccharomyces boulardii (Florastor) 250 mg capsule Take 1 capsule (250 mg) by mouth 2 times a day. (Patient not taking: Reported on 5/20/2025)      [DISCONTINUED] semaglutide (Rybelsus) 14 mg tablet tablet Take 1 tablet (14 mg) by mouth once daily. 90 tablet 3     No current facility-administered medications on file prior to visit.

## 2025-05-23 NOTE — ASSESSMENT & PLAN NOTE
CONTINUE all meds as prescribed  DM is Uncontrolled, last A1c of 7.2%. Recently restarted metfromin.  Educate on diet and exercise  Educate on Hypoglycemia  Pt due to protein/albumin levels, will defer to PCP to obtain at next visit  Discussed in depths regarding risk and benefits of CGM. Pt not indicated for Cgm at this time, no hx of glucose fluctuation and no on insulin  FUV in 3 months

## 2025-05-27 ENCOUNTER — OFFICE VISIT (OUTPATIENT)
Dept: GYNECOLOGIC ONCOLOGY | Facility: CLINIC | Age: 65
End: 2025-05-27
Payer: COMMERCIAL

## 2025-05-27 ENCOUNTER — LAB (OUTPATIENT)
Dept: LAB | Facility: CLINIC | Age: 65
End: 2025-05-27
Payer: COMMERCIAL

## 2025-05-27 VITALS
DIASTOLIC BLOOD PRESSURE: 73 MMHG | TEMPERATURE: 97 F | BODY MASS INDEX: 46.34 KG/M2 | HEART RATE: 84 BPM | WEIGHT: 278.44 LBS | SYSTOLIC BLOOD PRESSURE: 121 MMHG | OXYGEN SATURATION: 100 % | RESPIRATION RATE: 16 BRPM

## 2025-05-27 DIAGNOSIS — R82.90 ABNORMAL URINE ODOR: Primary | ICD-10-CM

## 2025-05-27 DIAGNOSIS — R82.90 ABNORMAL URINE ODOR: ICD-10-CM

## 2025-05-27 PROCEDURE — 3078F DIAST BP <80 MM HG: CPT | Performed by: NURSE PRACTITIONER

## 2025-05-27 PROCEDURE — 1159F MED LIST DOCD IN RCRD: CPT | Performed by: NURSE PRACTITIONER

## 2025-05-27 PROCEDURE — 99214 OFFICE O/P EST MOD 30 MIN: CPT | Performed by: NURSE PRACTITIONER

## 2025-05-27 PROCEDURE — 87186 SC STD MICRODIL/AGAR DIL: CPT

## 2025-05-27 PROCEDURE — 4010F ACE/ARB THERAPY RXD/TAKEN: CPT | Performed by: NURSE PRACTITIONER

## 2025-05-27 PROCEDURE — 1126F AMNT PAIN NOTED NONE PRSNT: CPT | Performed by: NURSE PRACTITIONER

## 2025-05-27 PROCEDURE — 3051F HG A1C>EQUAL 7.0%<8.0%: CPT | Performed by: NURSE PRACTITIONER

## 2025-05-27 PROCEDURE — 1036F TOBACCO NON-USER: CPT | Performed by: NURSE PRACTITIONER

## 2025-05-27 PROCEDURE — 3074F SYST BP LT 130 MM HG: CPT | Performed by: NURSE PRACTITIONER

## 2025-05-27 ASSESSMENT — PAIN SCALES - GENERAL: PAINLEVEL_OUTOF10: 0-NO PAIN

## 2025-05-30 ENCOUNTER — TELEPHONE (OUTPATIENT)
Dept: GYNECOLOGIC ONCOLOGY | Facility: HOSPITAL | Age: 65
End: 2025-05-30
Payer: COMMERCIAL

## 2025-05-30 DIAGNOSIS — R82.90 ABNORMAL URINE ODOR: Primary | ICD-10-CM

## 2025-05-30 LAB — BACTERIA UR CULT: ABNORMAL

## 2025-05-30 RX ORDER — NITROFURANTOIN 25; 75 MG/1; MG/1
100 CAPSULE ORAL 2 TIMES DAILY
Qty: 14 CAPSULE | Refills: 0 | Status: SHIPPED | OUTPATIENT
Start: 2025-05-30 | End: 2025-06-06

## 2025-05-30 NOTE — TELEPHONE ENCOUNTER
Phoned patient to notify that urine culture + for infection and Brenda Bryan CNP sent prescription for Macrobid 100mg to [facility]  pharmacy.  Instructed patient to take 1 table twice/day x 7 days.   Patient verbalized her understanding of information given.

## 2025-06-10 DIAGNOSIS — E11.65 TYPE 2 DIABETES MELLITUS WITH HYPERGLYCEMIA, WITHOUT LONG-TERM CURRENT USE OF INSULIN: Primary | ICD-10-CM

## 2025-06-10 RX ORDER — PIOGLITAZONE 15 MG/1
15 TABLET ORAL DAILY
Qty: 90 TABLET | Refills: 1 | Status: SHIPPED | OUTPATIENT
Start: 2025-06-10 | End: 2025-12-07

## 2025-06-18 DIAGNOSIS — E11.9 TYPE 2 DIABETES MELLITUS WITHOUT COMPLICATION, WITHOUT LONG-TERM CURRENT USE OF INSULIN: ICD-10-CM

## 2025-06-18 PROCEDURE — RXMED WILLOW AMBULATORY MEDICATION CHARGE

## 2025-06-19 ENCOUNTER — PHARMACY VISIT (OUTPATIENT)
Dept: PHARMACY | Facility: CLINIC | Age: 65
End: 2025-06-19
Payer: COMMERCIAL

## 2025-07-18 ENCOUNTER — TELEPHONE (OUTPATIENT)
Dept: GYNECOLOGIC ONCOLOGY | Facility: HOSPITAL | Age: 65
End: 2025-07-18
Payer: COMMERCIAL

## 2025-07-18 NOTE — TELEPHONE ENCOUNTER
"Phoned patient in follow up to below [employer] message.  Patient states she is having difficulty with memory and feeling \"disoriented\" at times which then leads to increasing her anxiety.     Patient states she has been experiencing these thoughts x approximately 2 years.    Patient states she experienced brain fog immediately following hysterectomy/BSO in 2022.     Patient also has h/o COVID.  Discussed with patient that estrogen is not generally prescribed due to her history of endometrial cancer.    Patient states she is feeling worried about change in memory and increased anxiety.   Message routed to Brenda Bryan CNP    1400   Return [employer] message sent to patient to update that Brenda recommends phone appointment with PCP or Brenda to discuss symptoms.   Patient sent message requesting appointment be scheduled with Brenda.   Message sent to coordinators requesting they schedule phone appointment with Brenda and notify patient.             Alejandro Gutierrez!      I wanted to know if you were the person I would talk about estrogen. I had the hysterectomy 3 years ago and I wasn't asked or approached about taking hormones. Since my hysterectomy I have found that my state of mind perhaps is changing. I feel overwhelmed, sometimes unable to make a decision. Something that I've never experienced before and my sister and two of my good friends asked me about hormones One in particular, estrogen. In so far as my girlfriend sent me information about it and what it says in this article sounds exactly like me. How does one know if they are lacking in hormones?     Thank you in advance for anything you can tell me or in any direction you may lead me to,      Jackie  [phone]  "

## 2025-07-18 NOTE — PROGRESS NOTES
Patient ID: Jackie Malagon is a 65 y.o. female.  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 She states she is overall doing well since her procedure but continues to have intermittent LE swelling with negative  BLE Dopplers for DVT.      Treatment History:  22: aborted diagnostic laparoscopy, lysis of adhesion - transvaginal hysterectomy with bilateral salpingo-oophorectomy (incomplete surgical staging)      Recent Pathology:  22: D&C   A.  Endocervix, curettage:                                           -- Very scant endocervical epithelium with no significant pathologic findings.          -- Scant superficial strips of inactive endometrial epithelium.                                              -- Specimen consists predominantly of blood.                                                                    B.  Endometrium and polyps, curettage:                                                 -- Endometrial adenocarcinoma, endometrioid type with squamous differentiation, FIGO grade 1, in the background of atypical endometrial hyperplasia.  See note.  -- Fragments of benign smooth muscle.  MMR Protein Expression  Protein:         Result:            MLH-1           Positive                                                PMS-2           Positive                                                  MSH-2           Positive                                                MSH-6           Positive  ==================================  Medical History:  - BMI 51  - Hypertension  - HLD   - GERD  - Stress urinary incontinence      Surgical History:  - C/section x2 (via midline incision)  - Cholecystectomy      Obstetric/Gynecologic History:   - Menarche age 12  - Menopausal since   - History of OCP or HRT use: OCPs x1 year, denies  "HRT  - Last Pap: 2/2022     Family History:   - Mother: melanoma  - Mat. uncle: lung cancer  - MGM: breast cancer      Social History: works as , identifies as [employer]. 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  Here for 6 month follow up.  Started on Estrace last visit for recurrent UTI and vaginal atrophy.     Phoned patient in follow up to below [employer] message.  Patient states she is having difficulty with memory and feeling \"disoriented\" at times which then leads to increasing her anxiety.     Patient states she has been experiencing these thoughts x approximately 2 years.    Patient states she experienced brain fog immediately following hysterectomy/BSO in 2022.     Patient also has h/o COVID.  Discussed with patient that estrogen is not generally prescribed due to her history of endometrial cancer.    Patient states she is feeling worried about change in memory and increased anxiety.   Message routed to Brenda Bryan CNP      Interval:   Patient states she has a hard time finding words at times, states she feels foggy.      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 Climacteric, anxiety and depression.       PLAN: RX for Celexa 10 mg at bedtime  F/U in 1 month or as needed to discuss dosing and symptoms    I performed this visit using " real-time telehealth tools, including an audio/video connection between the patient and myself. I spent 8 minutes virtually with this patient and/or family. More than 50% of the time was spent in counseling and/or coordination of care.

## 2025-07-22 ENCOUNTER — TELEMEDICINE (OUTPATIENT)
Dept: GYNECOLOGIC ONCOLOGY | Facility: CLINIC | Age: 65
End: 2025-07-22
Payer: COMMERCIAL

## 2025-07-22 DIAGNOSIS — C54.1 ENDOMETRIAL CANCER (MULTI): ICD-10-CM

## 2025-07-22 DIAGNOSIS — F32.A ANXIETY AND DEPRESSION: Primary | ICD-10-CM

## 2025-07-22 DIAGNOSIS — F41.9 ANXIETY AND DEPRESSION: Primary | ICD-10-CM

## 2025-07-22 PROCEDURE — 3051F HG A1C>EQUAL 7.0%<8.0%: CPT | Performed by: NURSE PRACTITIONER

## 2025-07-22 PROCEDURE — 99213 OFFICE O/P EST LOW 20 MIN: CPT | Performed by: NURSE PRACTITIONER

## 2025-07-22 PROCEDURE — 4010F ACE/ARB THERAPY RXD/TAKEN: CPT | Performed by: NURSE PRACTITIONER

## 2025-07-22 RX ORDER — CITALOPRAM 10 MG/1
10 TABLET ORAL DAILY
Qty: 30 TABLET | Refills: 11 | Status: SHIPPED | OUTPATIENT
Start: 2025-07-22 | End: 2026-07-22

## 2025-08-04 ENCOUNTER — TELEPHONE (OUTPATIENT)
Dept: GYNECOLOGIC ONCOLOGY | Facility: HOSPITAL | Age: 65
End: 2025-08-04
Payer: COMMERCIAL

## 2025-08-04 NOTE — TELEPHONE ENCOUNTER
Received message from pt that she stopped celexa due to headaches. Call to her and left message that I have requested that Brenda Bryan offer alternative to celexa.

## 2025-08-06 ENCOUNTER — TELEPHONE (OUTPATIENT)
Dept: GYNECOLOGIC ONCOLOGY | Facility: HOSPITAL | Age: 65
End: 2025-08-06
Payer: COMMERCIAL

## 2025-08-06 NOTE — TELEPHONE ENCOUNTER
Received message from pt relating that she stopped celexa due to side effects, namely, headaches. Per Brenda Bryan, call to pt and offered alternative option of buspar. Reviewed side effect profile with her and she declined to start buspar at this time. She shared that celexa was recommended due to depressive symptoms/crying. I offered referral to SW or counseling to help manage symptoms. She declined at this time and will discuss with Brenda at her appt in a few weeks.

## 2025-08-18 DIAGNOSIS — E11.65 TYPE 2 DIABETES MELLITUS WITH HYPERGLYCEMIA, WITHOUT LONG-TERM CURRENT USE OF INSULIN: Primary | ICD-10-CM

## 2025-08-18 RX ORDER — METFORMIN HYDROCHLORIDE 500 MG/1
1000 TABLET, EXTENDED RELEASE ORAL
Qty: 360 TABLET | Refills: 1 | Status: SHIPPED | OUTPATIENT
Start: 2025-08-18 | End: 2026-02-14

## 2025-08-19 ENCOUNTER — TELEMEDICINE (OUTPATIENT)
Dept: GYNECOLOGIC ONCOLOGY | Facility: CLINIC | Age: 65
End: 2025-08-19
Payer: COMMERCIAL

## 2025-08-19 DIAGNOSIS — F32.A ANXIETY AND DEPRESSION: ICD-10-CM

## 2025-08-19 DIAGNOSIS — F41.9 ANXIETY AND DEPRESSION: ICD-10-CM

## 2025-08-19 DIAGNOSIS — C54.1 ENDOMETRIAL CANCER (MULTI): ICD-10-CM

## 2025-08-19 DIAGNOSIS — N39.0 RECURRENT UTI: Primary | ICD-10-CM

## 2025-08-19 PROCEDURE — 99214 OFFICE O/P EST MOD 30 MIN: CPT | Performed by: NURSE PRACTITIONER

## 2025-08-19 PROCEDURE — 4010F ACE/ARB THERAPY RXD/TAKEN: CPT | Performed by: NURSE PRACTITIONER

## 2025-08-19 PROCEDURE — 3051F HG A1C>EQUAL 7.0%<8.0%: CPT | Performed by: NURSE PRACTITIONER

## 2025-08-22 ENCOUNTER — APPOINTMENT (OUTPATIENT)
Dept: PHARMACY | Facility: HOSPITAL | Age: 65
End: 2025-08-22
Payer: COMMERCIAL

## 2025-08-22 DIAGNOSIS — E11.9 TYPE 2 DIABETES MELLITUS WITHOUT COMPLICATION, WITHOUT LONG-TERM CURRENT USE OF INSULIN: ICD-10-CM

## 2025-08-22 DIAGNOSIS — E11.65 TYPE 2 DIABETES MELLITUS WITH HYPERGLYCEMIA, WITHOUT LONG-TERM CURRENT USE OF INSULIN: Primary | ICD-10-CM

## 2025-08-27 PROCEDURE — RXMED WILLOW AMBULATORY MEDICATION CHARGE

## 2025-08-28 ENCOUNTER — PHARMACY VISIT (OUTPATIENT)
Dept: PHARMACY | Facility: CLINIC | Age: 65
End: 2025-08-28
Payer: COMMERCIAL

## 2025-11-25 ENCOUNTER — APPOINTMENT (OUTPATIENT)
Dept: PRIMARY CARE | Facility: CLINIC | Age: 65
End: 2025-11-25
Payer: COMMERCIAL

## 2025-12-05 ENCOUNTER — APPOINTMENT (OUTPATIENT)
Dept: PHARMACY | Facility: HOSPITAL | Age: 65
End: 2025-12-05
Payer: COMMERCIAL

## 2026-08-20 ENCOUNTER — APPOINTMENT (OUTPATIENT)
Dept: GYNECOLOGIC ONCOLOGY | Facility: CLINIC | Age: 66
End: 2026-08-20
Payer: COMMERCIAL